# Patient Record
Sex: MALE | Race: BLACK OR AFRICAN AMERICAN | Employment: UNEMPLOYED | URBAN - METROPOLITAN AREA
[De-identification: names, ages, dates, MRNs, and addresses within clinical notes are randomized per-mention and may not be internally consistent; named-entity substitution may affect disease eponyms.]

---

## 2017-05-20 ENCOUNTER — ED HISTORICAL/CONVERTED ENCOUNTER (OUTPATIENT)
Dept: OTHER | Age: 72
End: 2017-05-20

## 2018-09-03 ENCOUNTER — ED HISTORICAL/CONVERTED ENCOUNTER (OUTPATIENT)
Dept: OTHER | Age: 73
End: 2018-09-03

## 2019-01-02 ENCOUNTER — ED HISTORICAL/CONVERTED ENCOUNTER (OUTPATIENT)
Dept: OTHER | Age: 74
End: 2019-01-02

## 2019-03-13 ENCOUNTER — ED HISTORICAL/CONVERTED ENCOUNTER (OUTPATIENT)
Dept: OTHER | Age: 74
End: 2019-03-13

## 2019-05-07 ENCOUNTER — ED HISTORICAL/CONVERTED ENCOUNTER (OUTPATIENT)
Dept: OTHER | Age: 74
End: 2019-05-07

## 2019-05-20 ENCOUNTER — ED HISTORICAL/CONVERTED ENCOUNTER (OUTPATIENT)
Dept: OTHER | Age: 74
End: 2019-05-20

## 2019-06-08 ENCOUNTER — ED HISTORICAL/CONVERTED ENCOUNTER (OUTPATIENT)
Dept: OTHER | Age: 74
End: 2019-06-08

## 2019-07-05 ENCOUNTER — ED HISTORICAL/CONVERTED ENCOUNTER (OUTPATIENT)
Dept: OTHER | Age: 74
End: 2019-07-05

## 2019-09-20 ENCOUNTER — ED HISTORICAL/CONVERTED ENCOUNTER (OUTPATIENT)
Dept: OTHER | Age: 74
End: 2019-09-20

## 2019-10-19 ENCOUNTER — ED HISTORICAL/CONVERTED ENCOUNTER (OUTPATIENT)
Dept: OTHER | Age: 74
End: 2019-10-19

## 2019-11-01 ENCOUNTER — ED HISTORICAL/CONVERTED ENCOUNTER (OUTPATIENT)
Dept: OTHER | Age: 74
End: 2019-11-01

## 2019-11-04 ENCOUNTER — ED HISTORICAL/CONVERTED ENCOUNTER (OUTPATIENT)
Dept: OTHER | Age: 74
End: 2019-11-04

## 2019-11-09 ENCOUNTER — ED HISTORICAL/CONVERTED ENCOUNTER (OUTPATIENT)
Dept: OTHER | Age: 74
End: 2019-11-09

## 2019-11-22 ENCOUNTER — ED HISTORICAL/CONVERTED ENCOUNTER (OUTPATIENT)
Dept: OTHER | Age: 74
End: 2019-11-22

## 2019-12-03 ENCOUNTER — ED HISTORICAL/CONVERTED ENCOUNTER (OUTPATIENT)
Dept: OTHER | Age: 74
End: 2019-12-03

## 2019-12-13 ENCOUNTER — ED HISTORICAL/CONVERTED ENCOUNTER (OUTPATIENT)
Dept: OTHER | Age: 74
End: 2019-12-13

## 2019-12-24 ENCOUNTER — ED HISTORICAL/CONVERTED ENCOUNTER (OUTPATIENT)
Dept: OTHER | Age: 74
End: 2019-12-24

## 2019-12-25 ENCOUNTER — ED HISTORICAL/CONVERTED ENCOUNTER (OUTPATIENT)
Dept: OTHER | Age: 74
End: 2019-12-25

## 2019-12-28 ENCOUNTER — ED HISTORICAL/CONVERTED ENCOUNTER (OUTPATIENT)
Dept: OTHER | Age: 74
End: 2019-12-28

## 2019-12-29 ENCOUNTER — ED HISTORICAL/CONVERTED ENCOUNTER (OUTPATIENT)
Dept: OTHER | Age: 74
End: 2019-12-29

## 2020-01-01 ENCOUNTER — ED HISTORICAL/CONVERTED ENCOUNTER (OUTPATIENT)
Dept: OTHER | Age: 75
End: 2020-01-01

## 2020-01-11 ENCOUNTER — ED HISTORICAL/CONVERTED ENCOUNTER (OUTPATIENT)
Dept: OTHER | Age: 75
End: 2020-01-11

## 2020-02-04 ENCOUNTER — ED HISTORICAL/CONVERTED ENCOUNTER (OUTPATIENT)
Dept: OTHER | Age: 75
End: 2020-02-04

## 2020-02-05 ENCOUNTER — ED HISTORICAL/CONVERTED ENCOUNTER (OUTPATIENT)
Dept: OTHER | Age: 75
End: 2020-02-05

## 2020-02-06 ENCOUNTER — ED HISTORICAL/CONVERTED ENCOUNTER (OUTPATIENT)
Dept: OTHER | Age: 75
End: 2020-02-06

## 2020-02-08 ENCOUNTER — ED HISTORICAL/CONVERTED ENCOUNTER (OUTPATIENT)
Dept: OTHER | Age: 75
End: 2020-02-08

## 2020-02-11 ENCOUNTER — ED HISTORICAL/CONVERTED ENCOUNTER (OUTPATIENT)
Dept: OTHER | Age: 75
End: 2020-02-11

## 2020-02-16 ENCOUNTER — ED HISTORICAL/CONVERTED ENCOUNTER (OUTPATIENT)
Dept: OTHER | Age: 75
End: 2020-02-16

## 2020-02-21 ENCOUNTER — ED HISTORICAL/CONVERTED ENCOUNTER (OUTPATIENT)
Dept: OTHER | Age: 75
End: 2020-02-21

## 2020-02-24 ENCOUNTER — ED HISTORICAL/CONVERTED ENCOUNTER (OUTPATIENT)
Dept: OTHER | Age: 75
End: 2020-02-24

## 2020-03-08 ENCOUNTER — ED HISTORICAL/CONVERTED ENCOUNTER (OUTPATIENT)
Dept: OTHER | Age: 75
End: 2020-03-08

## 2020-03-15 ENCOUNTER — ED HISTORICAL/CONVERTED ENCOUNTER (OUTPATIENT)
Dept: OTHER | Age: 75
End: 2020-03-15

## 2020-03-16 ENCOUNTER — ED HISTORICAL/CONVERTED ENCOUNTER (OUTPATIENT)
Dept: OTHER | Age: 75
End: 2020-03-16

## 2020-03-18 ENCOUNTER — ED HISTORICAL/CONVERTED ENCOUNTER (OUTPATIENT)
Dept: OTHER | Age: 75
End: 2020-03-18

## 2020-04-11 ENCOUNTER — ED HISTORICAL/CONVERTED ENCOUNTER (OUTPATIENT)
Dept: OTHER | Age: 75
End: 2020-04-11

## 2020-05-14 ENCOUNTER — ED HISTORICAL/CONVERTED ENCOUNTER (OUTPATIENT)
Dept: OTHER | Age: 75
End: 2020-05-14

## 2020-05-20 ENCOUNTER — ED HISTORICAL/CONVERTED ENCOUNTER (OUTPATIENT)
Dept: OTHER | Age: 75
End: 2020-05-20

## 2022-08-20 ENCOUNTER — HOSPITAL ENCOUNTER (EMERGENCY)
Dept: CT IMAGING | Age: 77
Discharge: HOME | End: 2022-08-20
Attending: NURSE PRACTITIONER
Payer: MEDICARE

## 2022-08-20 ENCOUNTER — HOSPITAL ENCOUNTER (EMERGENCY)
Age: 77
Discharge: HOME | End: 2022-08-20
Attending: STUDENT IN AN ORGANIZED HEALTH CARE EDUCATION/TRAINING PROGRAM
Payer: MEDICARE

## 2022-08-20 VITALS
OXYGEN SATURATION: 97 % | SYSTOLIC BLOOD PRESSURE: 148 MMHG | HEART RATE: 86 BPM | RESPIRATION RATE: 16 BRPM | DIASTOLIC BLOOD PRESSURE: 89 MMHG

## 2022-08-20 DIAGNOSIS — M54.50 ACUTE MIDLINE LOW BACK PAIN WITHOUT SCIATICA: ICD-10-CM
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
## 2022-08-20 PROCEDURE — 70450 CT HEAD/BRAIN W/O DYE: CPT

## 2022-08-20 PROCEDURE — 72131 CT LUMBAR SPINE W/O DYE: CPT

## 2022-08-20 PROCEDURE — 72125 CT NECK SPINE W/O DYE: CPT

## 2022-08-20 PROCEDURE — 99284 EMERGENCY DEPT VISIT MOD MDM: CPT

## 2022-08-20 RX ORDER — IBUPROFEN 600 MG/1
600 TABLET ORAL
Qty: 20 TABLET | Refills: 0 | Status: SHIPPED | OUTPATIENT
Start: 2022-08-20 | End: 2022-08-27

## 2022-08-20 RX ORDER — METHOCARBAMOL 500 MG/1
500 TABLET, FILM COATED ORAL
Qty: 21 TABLET | Refills: 0 | Status: SHIPPED | OUTPATIENT
Start: 2022-08-20 | End: 2022-08-27
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
## 2022-12-27 ENCOUNTER — HOSPITAL ENCOUNTER (EMERGENCY)
Age: 77
Discharge: HOME | End: 2022-12-27
Attending: EMERGENCY MEDICINE
Payer: MEDICARE

## 2022-12-27 ENCOUNTER — APPOINTMENT (OUTPATIENT)
Dept: GENERAL RADIOLOGY | Age: 77
End: 2022-12-27
Attending: EMERGENCY MEDICINE
Payer: MEDICARE

## 2022-12-27 VITALS
HEART RATE: 85 BPM | OXYGEN SATURATION: 96 % | BODY MASS INDEX: 32.19 KG/M2 | DIASTOLIC BLOOD PRESSURE: 85 MMHG | TEMPERATURE: 98.7 F | WEIGHT: 237.66 LBS | HEIGHT: 72 IN | RESPIRATION RATE: 18 BRPM | SYSTOLIC BLOOD PRESSURE: 142 MMHG

## 2022-12-27 DIAGNOSIS — U07.1 COVID-19: Primary | ICD-10-CM

## 2022-12-27 LAB
FLUAV RNA SPEC QL NAA+PROBE: NOT DETECTED
FLUBV RNA SPEC QL NAA+PROBE: NOT DETECTED
SARS-COV-2, COV2: DETECTED

## 2022-12-27 PROCEDURE — 87636 SARSCOV2 & INF A&B AMP PRB: CPT

## 2022-12-27 PROCEDURE — 74011250637 HC RX REV CODE- 250/637: Performed by: EMERGENCY MEDICINE

## 2022-12-27 PROCEDURE — 71045 X-RAY EXAM CHEST 1 VIEW: CPT

## 2022-12-27 PROCEDURE — 74011250636 HC RX REV CODE- 250/636: Performed by: EMERGENCY MEDICINE

## 2022-12-27 PROCEDURE — 99284 EMERGENCY DEPT VISIT MOD MDM: CPT

## 2022-12-27 PROCEDURE — 96372 THER/PROPH/DIAG INJ SC/IM: CPT

## 2022-12-27 RX ORDER — KETOROLAC TROMETHAMINE 30 MG/ML
30 INJECTION, SOLUTION INTRAMUSCULAR; INTRAVENOUS
Status: COMPLETED | OUTPATIENT
Start: 2022-12-27 | End: 2022-12-27

## 2022-12-27 RX ORDER — ACETAMINOPHEN 500 MG
1000 TABLET ORAL ONCE
Status: COMPLETED | OUTPATIENT
Start: 2022-12-27 | End: 2022-12-27

## 2022-12-27 RX ADMIN — ACETAMINOPHEN 1000 MG: 500 TABLET ORAL at 19:59

## 2022-12-27 RX ADMIN — KETOROLAC TROMETHAMINE 30 MG: 30 INJECTION, SOLUTION INTRAMUSCULAR; INTRAVENOUS at 19:59

## 2022-12-27 NOTE — ED TRIAGE NOTES
Patient presents to the ED with c/o generalized body aches and cough cold symptoms x3 days. Pt reports a cough and nasal congestions.

## 2022-12-28 NOTE — ED NOTES
Pt presents to ED reporting cold-like symptoms x 3 days and arthritic pain to back, right shoulder, and right knee. Pt denies known sick contacts but states he knows people around him have been sick. Pt endorsing productive cough with tan mucus, rhinorrhea, sore throat, and nasal congestion. Pt endorses shortness of breath but states this is normal for him. Pt appears confused during assessment but is able to answer questions appropriately. Pt asking for Tylenol for pain. Emergency Department Nursing Plan of Care       The Nursing Plan of Care is developed from the Nursing assessment and Emergency Department Attending provider initial evaluation. The plan of care may be reviewed in the ED Provider note.     The Plan of Care was developed with the following considerations:   Patient / Family readiness to learn indicated by:verbalized understanding  Persons(s) to be included in education: patient  Barriers to Learning/Limitations:No    Signed     Roger Zelaya RN    12/27/2022   7:47 PM

## 2022-12-28 NOTE — ED NOTES
Discharge instructions were given to the patient by Astrid Kidd RN. The patient left the Emergency Department ambulatory, alert and oriented and in no acute distress with no prescriptions. The patient was encouraged to call or return to the ED for worsening issues or problems and was encouraged to schedule a follow up appointment for continuing care. The patient verbalized understanding of discharge instructions and prescriptions, all questions were answered. The patient has no further concerns at this time.

## 2022-12-28 NOTE — ED PROVIDER NOTES
EMERGENCY DEPARTMENT HISTORY AND PHYSICAL EXAM      Date: 12/27/2022  Patient Name: Ehsan Gilliam  Patient Age and Sex: 68 y.o. male     History of Presenting Illness     Chief Complaint   Patient presents with    Generalized Body Aches    Cold Symptoms       History Provided By: Patient    HPI: Ehsan Gilliam is a 59-year-old history of hypertension, diabetes presenting with cough myalgias, subjective fever. He reports over the 4 to 5 days he has had diffuse myalgias and arthralgias involving his back his joints with associated headache, cough runny nose. Cough is productive for brown sputum, he has noted subjective fevers as well. No shortness of breath. No nausea vomiting or diarrhea. He did not receive his flu shot this year. He believes he is vaccinated against COVID but he is unsure. He denies tobacco abuse or any history of lung disease. There are no other complaints, changes, or physical findings at this time. PCP: None    No current facility-administered medications on file prior to encounter. No current outpatient medications on file prior to encounter. Past History     Past Medical History:  Hypertension, diabetes, recent homelessness    Past Surgical History:  No prior surgeries    Family History:  No pertinent family history    Social History:  Social History     Tobacco Use    Smoking status: Unknown   Substance Use Topics    Alcohol use: Not Currently    Drug use: Not Currently       Allergies:  No Known Allergies      Review of Systems   Review of Systems   Constitutional:  Positive for fever. Negative for chills. HENT:  Positive for congestion and rhinorrhea. Respiratory:  Positive for cough. Negative for shortness of breath. Cardiovascular:  Negative for chest pain. Gastrointestinal:  Negative for abdominal pain, diarrhea, nausea and vomiting. Genitourinary:  Negative for dysuria and frequency. Musculoskeletal:  Positive for arthralgias and myalgias.    Skin: Negative for rash. Neurological:  Positive for headaches. Negative for weakness and numbness. All other systems reviewed and are negative. Physical Exam   Physical Exam  Vitals and nursing note reviewed. Constitutional:       Appearance: Normal appearance. HENT:      Head: Normocephalic and atraumatic. Mouth/Throat:      Mouth: Mucous membranes are moist.   Eyes:      Conjunctiva/sclera: Conjunctivae normal.   Cardiovascular:      Rate and Rhythm: Normal rate and regular rhythm. Pulmonary:      Effort: Pulmonary effort is normal.      Breath sounds: Normal breath sounds. Abdominal:      General: Abdomen is flat. Musculoskeletal:         General: No deformity. Right lower leg: No edema. Left lower leg: No edema. Skin:     General: Skin is warm and dry. Neurological:      Mental Status: He is alert and oriented to person, place, and time. Mental status is at baseline. Psychiatric:         Behavior: Behavior normal.         Thought Content: Thought content normal.        Diagnostic Study Results     Labs -     Recent Results (from the past 12 hour(s))   COVID-19 WITH INFLUENZA A/B    Collection Time: 12/27/22  7:50 PM   Result Value Ref Range    SARS-CoV-2 by PCR Detected (A) NOTD      Influenza A by PCR Not detected      Influenza B by PCR Not detected         Radiologic Studies -   XR CHEST PORT   Final Result      No acute process on portable chest.           CT Results  (Last 48 hours)      None          CXR Results  (Last 48 hours)                 12/27/22 2021  XR CHEST PORT Final result    Impression:      No acute process on portable chest.           Narrative:  EXAM:  XR CHEST PORT       INDICATION: Productive cough with fever       COMPARISON: May 2017       TECHNIQUE: portable chest AP view       FINDINGS: The cardiac silhouette is within normal limits. The pulmonary   vasculature is within normal limits. The lungs and pleural spaces are clear.  The visualized bones and upper abdomen   are age-appropriate. Medical Decision Making   I am the first provider for this patient. I reviewed the vital signs, available nursing notes, past medical history, past surgical history, family history and social history. Vital Signs-Reviewed the patient's vital signs. Patient Vitals for the past 12 hrs:   Temp Pulse Resp BP SpO2   12/27/22 1855 98.7 °F (37.1 °C) 85 18 (!) 142/85 96 %       Records Reviewed: Nursing Notes and Old Medical Records    Provider Notes (Medical Decision Making):   Recently undomiciled male presenting with cough and cold symptoms    He is well-appearing well-perfused satting 96% on room air without abnormal breath sounds. However he is endorsing productive cough of brown sputum will obtain chest x-ray to evaluate for pneumonia. Diabetic, will defer steroids as he has clear breath sounds at this time. Will test for flu COVID, give shot of Toradol for his diffuse arthralgias. ED Course:   Initial assessment performed. The patients presenting problems have been discussed, and they are in agreement with the care plan formulated and outlined with them. I have encouraged them to ask questions as they arise throughout their visit. ED Course as of 12/27/22 2111 Tue Dec 27, 2022   2101 Chest x-ray is negative, COVID detected [WB]   2108 Patient states he takes multiple medications, though is unaware of their names. No meds in EMR. Unfortunately given multiple medication interaction we will not prescribe COVID antiviral given that I am unable to verify his medications [WB]      ED Course User Index  [WB] Leonel Gutierrez MD     Disposition:  Discharge Note:  The patient has been re-evaluated and is ready for discharge. Reviewed available results with patient. Counseled patient on diagnosis and care plan. Patient has expressed understanding, and all questions have been answered.  Patient agrees with plan and agrees to follow up as recommended, or to return to the ED if their symptoms worsen. Discharge instructions have been provided and explained to the patient, along with reasons to return to the ED. PLAN:  There are no discharge medications for this patient. 2.   Follow-up Information       Follow up With Specialties Details Why [address] [facility] EMERGENCY DEPT Emergency Medicine  As needed, If symptoms worsen [address]          3. Return to ED if worse     Diagnosis     Clinical Impression:   1. COVID-19        Attestations:    Nicolasa Baumann M.D. Please note that this dictation was completed with [employer], the computer voice recognition software. Quite often unanticipated grammatical, syntax, homophones, and other interpretive errors are inadvertently transcribed by the computer software. Please disregard these errors. Please excuse any errors that have escaped final proofreading. Thank you.

## 2023-01-22 ENCOUNTER — HOSPITAL ENCOUNTER (EMERGENCY)
Age: 78
Discharge: HOME | End: 2023-01-22
Attending: EMERGENCY MEDICINE
Payer: MEDICARE

## 2023-01-22 VITALS
BODY MASS INDEX: 32.23 KG/M2 | RESPIRATION RATE: 18 BRPM | TEMPERATURE: 96.8 F | DIASTOLIC BLOOD PRESSURE: 82 MMHG | WEIGHT: 237.66 LBS | SYSTOLIC BLOOD PRESSURE: 136 MMHG | OXYGEN SATURATION: 98 % | HEART RATE: 68 BPM

## 2023-01-22 DIAGNOSIS — G89.29 CHRONIC BILATERAL LOW BACK PAIN WITHOUT SCIATICA: ICD-10-CM

## 2023-01-22 DIAGNOSIS — M19.90 OSTEOARTHRITIS, UNSPECIFIED OSTEOARTHRITIS TYPE, UNSPECIFIED SITE: Primary | ICD-10-CM

## 2023-01-22 DIAGNOSIS — M54.50 CHRONIC BILATERAL LOW BACK PAIN WITHOUT SCIATICA: ICD-10-CM

## 2023-01-22 PROCEDURE — 99283 EMERGENCY DEPT VISIT LOW MDM: CPT

## 2023-01-22 PROCEDURE — 74011250637 HC RX REV CODE- 250/637: Performed by: EMERGENCY MEDICINE

## 2023-01-22 RX ORDER — IBUPROFEN 600 MG/1
600 TABLET ORAL
Qty: 50 TABLET | Refills: 0 | Status: SHIPPED | OUTPATIENT
Start: 2023-01-22

## 2023-01-22 RX ORDER — ACETAMINOPHEN 325 MG/1
650 TABLET ORAL
Qty: 50 TABLET | Refills: 0 | Status: SHIPPED | OUTPATIENT
Start: 2023-01-22

## 2023-01-22 RX ORDER — ACETAMINOPHEN 325 MG/1
650 TABLET ORAL
Qty: 20 TABLET | Refills: 0 | Status: SHIPPED | OUTPATIENT
Start: 2023-01-22 | End: 2023-01-22 | Stop reason: SDUPTHER

## 2023-01-22 RX ORDER — IBUPROFEN 600 MG/1
600 TABLET ORAL
Qty: 20 TABLET | Refills: 0 | Status: SHIPPED | OUTPATIENT
Start: 2023-01-22 | End: 2023-01-22 | Stop reason: SDUPTHER

## 2023-01-22 RX ORDER — IBUPROFEN 400 MG/1
800 TABLET ORAL ONCE
Status: COMPLETED | OUTPATIENT
Start: 2023-01-22 | End: 2023-01-22

## 2023-01-22 RX ORDER — ACETAMINOPHEN 500 MG
1000 TABLET ORAL ONCE
Status: COMPLETED | OUTPATIENT
Start: 2023-01-22 | End: 2023-01-22

## 2023-01-22 RX ADMIN — IBUPROFEN 800 MG: 400 TABLET, FILM COATED ORAL at 14:19

## 2023-01-22 RX ADMIN — ACETAMINOPHEN 1000 MG: 500 TABLET ORAL at 14:19

## 2023-01-22 NOTE — ED NOTES
Emergency Department Nursing Plan of Care       The Nursing Plan of Care is developed from the Nursing assessment and Emergency Department Attending provider initial evaluation. The plan of care may be reviewed in the ED Provider note.     The Plan of Care was developed with the following considerations:   Patient / Family readiness to learn indicated by:verbalized understanding  Persons(s) to be included in education: patient  Barriers to Learning/Limitations:No    [id number] [facility] MOHSEN Serra    1/22/2023   2:31 PM

## 2023-01-22 NOTE — ED PROVIDER NOTES
[facility] EMERGENCY DEPT  EMERGENCY DEPARTMENT ENCOUNTER       Pt Name: Jennifer Waddell  MRN: [id number]  [place] 1945  Date of evaluation: 1/22/2023  Provider: Verónica Gonzalez MD   PCP: None  Note Started: 2:03 PM 1/22/23     CHIEF COMPLAINT       Chief Complaint   Patient presents with    Back Pain        HISTORY OF PRESENT ILLNESS: 1 or more elements      History From: Patient, History limited by: None     Jennifer Waddell is a 68 y.o. male who presents with chronic aches and pains. He reports pains to his low back, left elbow and right knee for months to years. Reports some worsening of his chronic pain as temperatures have dropped. He is currently homeless and sleeping outside, attributes his pain mostly to the cold. No fevers. No chills, denies any chronic medical issues. Nursing Notes were all reviewed and agreed with or any disagreements were addressed in the HPI. REVIEW OF SYSTEMS        Positives and Pertinent negatives as per HPI. PAST HISTORY     Past Medical History:  No past medical history on file. Past Surgical History:  No past surgical history on file. Family History:  No family history on file. Social History:  Social History     Tobacco Use    Smoking status: Unknown   Substance Use Topics    Alcohol use: Not Currently    Drug use: Not Currently       Allergies:  No Known Allergies    CURRENT MEDICATIONS      Current Discharge Medication List          SCREENINGS               No data recorded         PHYSICAL EXAM      ED Triage Vitals [01/22/23 1336]   ED Encounter Vitals Group      /82      Pulse (Heart Rate) 68      Resp Rate 18      Temp 96.8 °F (36 °C)      Temp src       O2 Sat (%) 98 %      Weight 237 lb 10.5 oz      Height         Physical Exam  Vitals and nursing note reviewed. Constitutional:       General: He is not in acute distress. Appearance: He is obese. He is not ill-appearing or toxic-appearing.       Comments: Unkempt, malodorous   HENT:      Head: Normocephalic and atraumatic. Pulmonary:      Effort: Pulmonary effort is normal.      Breath sounds: Normal breath sounds. Musculoskeletal:      Right lower leg: No edema. Left lower leg: No edema. Comments: No midline CT or L-spine tenderness to palpation. There is no bony tenderness palpation of left elbow, no pain with range of motion of left elbow. Right knee without any bony tenderness to palpation, mild pain with passive range of motion, no palpable effusion, no warmth. Skin:     General: Skin is warm and dry. DIAGNOSTIC RESULTS   LABS:     No results found for this or any previous visit (from the past 12 hour(s)). EKG: If performed, independent interpretation documented below in the MDM section     RADIOLOGY:  Non-plain film images such as CT, Ultrasound and MRI are read by the radiologist. Plain radiographic images are visualized and preliminarily interpreted by the ED Provider with the findings documented in the MDM section. Interpretation per the Radiologist below, if available at the time of this note:     No results found. PROCEDURES   Unless otherwise noted below, none  Procedures     CRITICAL CARE TIME       EMERGENCY DEPARTMENT COURSE and DIFFERENTIAL DIAGNOSIS/MDM   Vitals:    Vitals:    01/22/23 1336   BP: 136/82   Pulse: 68   Resp: 18   Temp: 96.8 °F (36 °C)   SpO2: 98%   Weight: 107.8 kg (237 lb 10.5 oz)        Patient was given the following medications:  Medications   acetaminophen (TYLENOL) tablet 1,000 mg (has no administration in time range)   ibuprofen (MOTRIN) tablet 800 mg (has no administration in time range)       Medical Decision Making  DDx acute on chronic osteoarthritis, will review past records to evaluate for prior imaging of spine or joints.   Consider other etiology of arthritis however in absence of trauma, no fever or systemic symptoms, no warmth or palpable effusion, I doubt acute infection including SEA or septic arthritis    He is otherwise well-appearing, nonfocal neurologic exam of the lower extremities. We will treat presumptive acute on chronic osteoarthritis, offer list of shelters as he is requesting help with housing. Given something to eat and discharge him. Consider imaging however given absent trauma, no bony tenderness, I do not believe there is benefit to osseous imaging of joints or spine at this time. Risk  OTC drugs. Prescription drug management. Diagnosis or treatment significantly limited by social determinants of health. ED Course as of 01/22/23 1415   Sun Jan 22, 2023   9386 Chart review patient has been seen at outside ED for formerly [facility] August of last year, discharged with Robaxin and Motrin, CT head spine negative for acute injury at the time. Imaging report reviewed by myself shows severe foraminal stenosis, mild central canal stenosis [WB]      ED Course User Index  [WB] Elizabeth Khan MD         FINAL IMPRESSION     1. Osteoarthritis, unspecified osteoarthritis type, unspecified site    2. Chronic bilateral low back pain without sciatica          DISPOSITION/PLAN   Gabriel Nichols's  results have been reviewed with him. He has been counseled regarding his diagnosis, treatment, and plan. He verbally conveys understanding and agreement of the signs, symptoms, diagnosis, treatment and prognosis and additionally agrees to follow up as discussed. He also agrees with the care-plan and conveys that all of his questions have been answered. I have also provided discharge instructions for him that include: educational information regarding their diagnosis and treatment, and list of reasons why they would want to return to the ED prior to their follow-up appointment, should his condition change. CLINICAL IMPRESSION    Discharge Note: The patient is stable for discharge home. The signs, symptoms, diagnosis, and discharge instructions have been discussed, understanding conveyed, and agreed upon.  The patient is to follow up as recommended or return to ER should their symptoms worsen. PATIENT REFERRED TO:  Follow-up Information       Follow up With Specialties Details Why [address] [facility] EMERGENCY DEPT Emergency Medicine  As needed, If symptoms worsen [address] [facility]              DISCHARGE MEDICATIONS:  Current Discharge Medication List        START taking these medications    Details   acetaminophen (TYLENOL) 325 mg tablet Take 2 Tablets by mouth every four (4) hours as needed for Pain. Qty: 50 Tablet, Refills: 0  Start date: 1/22/2023      ibuprofen (MOTRIN) 600 mg tablet Take 1 Tablet by mouth every six (6) hours as needed for Pain. Qty: 50 Tablet, Refills: 0  Start date: 1/22/2023                 DISCONTINUED MEDICATIONS:  Current Discharge Medication List            I am the Primary Clinician of Record. Wild Fernando MD (electronically signed)    (Please note that parts of this dictation were completed with voice recognition software. Quite often unanticipated grammatical, syntax, homophones, and other interpretive errors are inadvertently transcribed by the computer software. Please disregards these errors.  Please excuse any errors that have escaped final proofreading.)

## 2023-01-22 NOTE — ED TRIAGE NOTES
Patient comes to the ED via EMS. Patient c/o back pain but will not elaborate. Said the information is \"in his record\". Will not answer questions at times.   Patient speaking of his living situation and asking for a

## 2023-01-22 NOTE — DISCHARGE INSTRUCTIONS
I recommend picking up the medications I did provide at our pharmacy and taking them every 6 hours as needed for ongoing aches and pains. If you develop fever, swelling or skin changes over the joints affected please return to emergency department for further evaluation. Please seek shelter one of the provided locations, you may return during weekday work hours to speak to a .

## 2024-01-14 ENCOUNTER — HOSPITAL ENCOUNTER (EMERGENCY)
Facility: HOSPITAL | Age: 79
Discharge: HOME | End: 2024-01-14
Attending: STUDENT IN AN ORGANIZED HEALTH CARE EDUCATION/TRAINING PROGRAM
Payer: MEDICARE

## 2024-01-14 VITALS
WEIGHT: 230 LBS | DIASTOLIC BLOOD PRESSURE: 78 MMHG | OXYGEN SATURATION: 99 % | RESPIRATION RATE: 18 BRPM | BODY MASS INDEX: 29.52 KG/M2 | HEIGHT: 74 IN | HEART RATE: 64 BPM | SYSTOLIC BLOOD PRESSURE: 132 MMHG | TEMPERATURE: 98.2 F

## 2024-01-14 DIAGNOSIS — M25.561 CHRONIC PAIN OF RIGHT KNEE: ICD-10-CM

## 2024-01-14 DIAGNOSIS — J06.9 VIRAL URI WITH COUGH: Primary | ICD-10-CM

## 2024-01-14 DIAGNOSIS — G89.29 CHRONIC PAIN OF RIGHT KNEE: ICD-10-CM

## 2024-01-14 PROCEDURE — 96372 THER/PROPH/DIAG INJ SC/IM: CPT

## 2024-01-14 PROCEDURE — 6360000002 HC RX W HCPCS: Performed by: STUDENT IN AN ORGANIZED HEALTH CARE EDUCATION/TRAINING PROGRAM

## 2024-01-14 PROCEDURE — 99284 EMERGENCY DEPT VISIT MOD MDM: CPT

## 2024-01-14 RX ORDER — DEXTROMETHORPHAN HBR. AND GUAIFENESIN 10; 100 MG/5ML; MG/5ML
10 SOLUTION ORAL
Status: DISCONTINUED | OUTPATIENT
Start: 2024-01-14 | End: 2024-01-14

## 2024-01-14 RX ORDER — ACETAMINOPHEN 325 MG/1
650 TABLET ORAL EVERY 6 HOURS PRN
Qty: 120 TABLET | Refills: 3 | Status: SHIPPED | OUTPATIENT
Start: 2024-01-14

## 2024-01-14 RX ORDER — DEXTROMETHORPHAN HBR. AND GUAIFENESIN 10; 100 MG/5ML; MG/5ML
10 SOLUTION ORAL EVERY 4 HOURS PRN
Status: DISCONTINUED | OUTPATIENT
Start: 2024-01-14 | End: 2024-01-14

## 2024-01-14 RX ORDER — BENZONATATE 100 MG/1
100 CAPSULE ORAL 2 TIMES DAILY PRN
Qty: 20 CAPSULE | Refills: 0 | Status: SHIPPED | OUTPATIENT
Start: 2024-01-14 | End: 2024-01-21

## 2024-01-14 RX ORDER — KETOROLAC TROMETHAMINE 15 MG/ML
15 INJECTION, SOLUTION INTRAMUSCULAR; INTRAVENOUS
Status: COMPLETED | OUTPATIENT
Start: 2024-01-14 | End: 2024-01-14

## 2024-01-14 RX ORDER — GUAIFENESIN/DEXTROMETHORPHAN 100-10MG/5
10 SYRUP ORAL ONCE
Status: DISCONTINUED | OUTPATIENT
Start: 2024-01-14 | End: 2024-01-14 | Stop reason: HOSPADM

## 2024-01-14 RX ADMIN — KETOROLAC TROMETHAMINE 15 MG: 15 INJECTION, SOLUTION INTRAMUSCULAR; INTRAVENOUS at 04:44

## 2024-01-14 ASSESSMENT — LIFESTYLE VARIABLES
HOW MANY STANDARD DRINKS CONTAINING ALCOHOL DO YOU HAVE ON A TYPICAL DAY: PATIENT DOES NOT DRINK
HOW OFTEN DO YOU HAVE A DRINK CONTAINING ALCOHOL: NEVER

## 2024-01-14 NOTE — ED NOTES
Transportation has been arranged for this patient through insurance [phone].  Trip number [id number] due to be picked up around 8:40am( they may arrive 15min before or after the time provided).

## 2024-01-14 NOTE — ED NOTES
Multiple attempts made to pharmacy r/t ordered cough medicine without resolution, pt notified that is what we are waiting on for dispo
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
## 2024-01-14 NOTE — ED PROVIDER NOTES
Pain  What changed: You were already taking a medication with the same name, and this prescription was added. Make sure you understand how and when to take each.           * This list has 2 medication(s) that are the same as other medications prescribed for you. Read the directions carefully, and ask your doctor or other care provider to review them with you.                ASK your doctor about these medications      ibuprofen 600 MG tablet  Commonly known as: ADVIL;MOTRIN               Where to Get Your Medications        These medications were sent to [facility] [address] [place] [address] - P [phone] - F [phone]  [address] [place] [address]      Phone: [phone]   acetaminophen 325 MG tablet  benzonatate 100 MG capsule           DISCONTINUED MEDICATIONS:  Current Discharge Medication List          I am the Primary Clinician of Record.   Felice Romero MD (electronically signed)    (Please note that parts of this dictation were completed with voice recognition software. Quite often unanticipated grammatical, syntax, homophones, and other interpretive errors are inadvertently transcribed by the computer software. Please disregards these errors. Please excuse any errors that have escaped final proofreading.)       Felice Romero MD  01/14/24 0832

## 2024-01-31 ENCOUNTER — APPOINTMENT (OUTPATIENT)
Facility: HOSPITAL | Age: 79
End: 2024-01-31
Payer: MEDICARE

## 2024-01-31 ENCOUNTER — HOSPITAL ENCOUNTER (EMERGENCY)
Facility: HOSPITAL | Age: 79
Discharge: HOME | End: 2024-01-31
Attending: EMERGENCY MEDICINE
Payer: MEDICARE

## 2024-01-31 ENCOUNTER — HOSPITAL ENCOUNTER (EMERGENCY)
Facility: HOSPITAL | Age: 79
Discharge: HOME | End: 2024-02-01
Attending: STUDENT IN AN ORGANIZED HEALTH CARE EDUCATION/TRAINING PROGRAM
Payer: MEDICARE

## 2024-01-31 VITALS
HEART RATE: 67 BPM | WEIGHT: 245 LBS | HEIGHT: 74 IN | SYSTOLIC BLOOD PRESSURE: 124 MMHG | DIASTOLIC BLOOD PRESSURE: 84 MMHG | BODY MASS INDEX: 31.44 KG/M2 | TEMPERATURE: 98.9 F | RESPIRATION RATE: 16 BRPM | OXYGEN SATURATION: 98 %

## 2024-01-31 DIAGNOSIS — Z59.00 HOMELESS: Primary | ICD-10-CM

## 2024-01-31 DIAGNOSIS — M25.562 CHRONIC PAIN OF BOTH KNEES: Primary | ICD-10-CM

## 2024-01-31 DIAGNOSIS — M25.561 CHRONIC PAIN OF BOTH KNEES: Primary | ICD-10-CM

## 2024-01-31 DIAGNOSIS — Z59.41 FOOD INSECURITY: ICD-10-CM

## 2024-01-31 DIAGNOSIS — G89.29 CHRONIC PAIN OF BOTH KNEES: Primary | ICD-10-CM

## 2024-01-31 DIAGNOSIS — M06.9 RHEUMATOID ARTHRITIS INVOLVING BOTH HANDS, UNSPECIFIED WHETHER RHEUMATOID FACTOR PRESENT (HCC): ICD-10-CM

## 2024-01-31 PROCEDURE — 99283 EMERGENCY DEPT VISIT LOW MDM: CPT

## 2024-01-31 PROCEDURE — 6370000000 HC RX 637 (ALT 250 FOR IP): Performed by: EMERGENCY MEDICINE

## 2024-01-31 PROCEDURE — 6370000000 HC RX 637 (ALT 250 FOR IP): Performed by: STUDENT IN AN ORGANIZED HEALTH CARE EDUCATION/TRAINING PROGRAM

## 2024-01-31 PROCEDURE — 71045 X-RAY EXAM CHEST 1 VIEW: CPT

## 2024-01-31 PROCEDURE — 93005 ELECTROCARDIOGRAM TRACING: CPT | Performed by: STUDENT IN AN ORGANIZED HEALTH CARE EDUCATION/TRAINING PROGRAM

## 2024-01-31 RX ORDER — HYDROCODONE BITARTRATE AND ACETAMINOPHEN 5; 325 MG/1; MG/1
1 TABLET ORAL
Status: COMPLETED | OUTPATIENT
Start: 2024-01-31 | End: 2024-01-31

## 2024-01-31 RX ORDER — IBUPROFEN 600 MG/1
600 TABLET ORAL
Status: COMPLETED | OUTPATIENT
Start: 2024-01-31 | End: 2024-01-31

## 2024-01-31 RX ADMIN — IBUPROFEN 600 MG: 600 TABLET, FILM COATED ORAL at 11:57

## 2024-01-31 RX ADMIN — HYDROCODONE BITARTRATE AND ACETAMINOPHEN 1 TABLET: 5; 325 TABLET ORAL at 23:48

## 2024-01-31 RX ADMIN — IBUPROFEN 600 MG: 600 TABLET, FILM COATED ORAL at 23:48

## 2024-01-31 SDOH — ECONOMIC STABILITY - HOUSING INSECURITY: HOMELESSNESS UNSPECIFIED: Z59.00

## 2024-01-31 SDOH — ECONOMIC STABILITY - FOOD INSECURITY: FOOD INSECURITY: Z59.41

## 2024-01-31 ASSESSMENT — LIFESTYLE VARIABLES
HOW OFTEN DO YOU HAVE A DRINK CONTAINING ALCOHOL: NEVER
HOW MANY STANDARD DRINKS CONTAINING ALCOHOL DO YOU HAVE ON A TYPICAL DAY: PATIENT DOES NOT DRINK
HOW MANY STANDARD DRINKS CONTAINING ALCOHOL DO YOU HAVE ON A TYPICAL DAY: 1 OR 2
HOW OFTEN DO YOU HAVE A DRINK CONTAINING ALCOHOL: MONTHLY OR LESS

## 2024-01-31 ASSESSMENT — PAIN - FUNCTIONAL ASSESSMENT
PAIN_FUNCTIONAL_ASSESSMENT: 0-10
PAIN_FUNCTIONAL_ASSESSMENT: 0-10

## 2024-01-31 ASSESSMENT — PAIN SCALES - GENERAL: PAINLEVEL_OUTOF10: 10

## 2024-01-31 NOTE — ED PROVIDER NOTES
[facility] EMERGENCY DEPT  EMERGENCY DEPARTMENT HISTORY AND PHYSICAL EXAM      Date: 1/31/2024  Patient Name: Kieran Braun  MRN: [id number]  Birthdate 1945  Date of evaluation: 1/31/2024  Provider: Esequiel Sarkar MD   Note Started: 9:58 AM EST 1/31/24    HISTORY OF PRESENT ILLNESS     Chief Complaint   Patient presents with    Knee Pain       History Provided By: Patient    HPI: Kieran Braun is a 78 y.o. male presents to the emergency room with complaint of bilateral knee pain.  Patient reports history of arthritis.  Patient denies fevers or chills, denies numbness or tingling.    PAST MEDICAL HISTORY   Past Medical History:  Past Medical History:   Diagnosis Date    Arthritis        Past Surgical History:  History reviewed. No pertinent surgical history.    Family History:  History reviewed. No pertinent family history.    Social History:  Social History     Tobacco Use    Smoking status: Some Days     Types: Cigarettes   Substance Use Topics    Alcohol use: Yes     Comment: occasionally    Drug use: Never       Allergies:  No Known Allergies    PCP: No primary care provider on file.    Current Meds:   Current Facility-Administered Medications   Medication Dose Route Frequency Provider Last Rate Last Admin    ibuprofen (ADVIL;MOTRIN) tablet 600 mg  600 mg Oral NOW Esequiel Sarkar MD         Current Outpatient Medications   Medication Sig Dispense Refill    acetaminophen (AMINOFEN) 325 MG tablet Take 2 tablets by mouth every 6 hours as needed for Pain 120 tablet 3    acetaminophen (TYLENOL) 325 MG tablet Take 2 tablets by mouth every 4 hours as needed      ibuprofen (ADVIL;MOTRIN) 600 MG tablet Take 1 tablet by mouth every 6 hours as needed         Social Determinants of Health:   Social Determinants of Health     Tobacco Use: High Risk (1/31/2024)    Patient History     Smoking Tobacco Use: Some Days     Smokeless Tobacco Use: Unknown     Passive Exposure: Not on file   Alcohol Use: Not At Risk

## 2024-01-31 NOTE — ED TRIAGE NOTES
Per EMS, pt walked to [facility], called EMS for arthritis flair up in bilateral knees. Pt also reports being homeless and needing help. Pt was ambulatory from stretcher to stretcher upon arrival. AxO4.

## 2024-01-31 NOTE — ED NOTES
Provider at bedside for dispo and follow up, all treatments completed as ordered. Discharge plan reviewed and paperwork signed, teaching completed regarding treatment received, medications and follow up care demonstrated and read back. pt wheeled out of the ER with no signs of acute distress.

## 2024-02-01 VITALS
HEIGHT: 74 IN | HEART RATE: 72 BPM | OXYGEN SATURATION: 99 % | BODY MASS INDEX: 31.44 KG/M2 | RESPIRATION RATE: 16 BRPM | SYSTOLIC BLOOD PRESSURE: 129 MMHG | DIASTOLIC BLOOD PRESSURE: 73 MMHG | WEIGHT: 245 LBS | TEMPERATURE: 98.2 F

## 2024-02-01 LAB
EKG ATRIAL RATE: 71 BPM
EKG DIAGNOSIS: NORMAL
EKG P AXIS: 42 DEGREES
EKG P-R INTERVAL: 144 MS
EKG Q-T INTERVAL: 416 MS
EKG QRS DURATION: 76 MS
EKG QTC CALCULATION (BAZETT): 452 MS
EKG R AXIS: -14 DEGREES
EKG T AXIS: 21 DEGREES
EKG VENTRICULAR RATE: 71 BPM

## 2024-02-01 PROCEDURE — 6370000000 HC RX 637 (ALT 250 FOR IP): Performed by: STUDENT IN AN ORGANIZED HEALTH CARE EDUCATION/TRAINING PROGRAM

## 2024-02-01 RX ORDER — AZITHROMYCIN 250 MG/1
250 TABLET, FILM COATED ORAL DAILY
Qty: 5 TABLET | Refills: 0 | Status: SHIPPED | OUTPATIENT
Start: 2024-02-01 | End: 2024-02-06

## 2024-02-01 RX ORDER — AZITHROMYCIN 500 MG/1
500 TABLET, FILM COATED ORAL
Status: COMPLETED | OUTPATIENT
Start: 2024-02-01 | End: 2024-02-01

## 2024-02-01 RX ADMIN — AZITHROMYCIN DIHYDRATE 500 MG: 500 TABLET ORAL at 01:33

## 2024-02-01 NOTE — DISCHARGE INSTRUCTIONS
Thank you!    Thank you for allowing me to care for you in the emergency department.  I sincerely hope that you are satisfied with your visit today.  It is my goal to provide you with excellent care.    Below you will find a list of your labs and imaging from your visit today if applicable. Should you have any questions regarding these results please do not hesitate to call the emergency department. Please review [employer] for a more detailed result list since the below list may not be comprehensive. Instructions on how to sign up to [employer] should be provided in this packet.    Labs -  No results found for this or any previous visit (from the past 12 hour(s)).    Radiologic Studies -   XR CHEST PORTABLE   Final Result      Mild pulmonary edema pattern.                If you feel that you have not received excellent quality care or timely care, please ask to speak to the nurse manager. Please choose us in the future for your continued health care needs.   ------------------------------------------------------------------------------------------------------------  The exam and treatment you received in the Emergency Department were for an urgent problem and are not intended as complete care. It is important that you follow-up with a doctor, nurse practitioner, or physician assistant to:  (1) confirm your diagnosis,  (2) re-evaluation of changes in your illness and treatment, and  (3) for ongoing care.  If your symptoms become worse or you do not improve as expected and you are unable to reach your usual health care provider, you should return to the Emergency Department. We are available 24 hours a day.     Please take your discharge instructions with you when you go to your follow-up appointment.     If a prescription has been provided, please have it filled as soon as possible to prevent a delay in treatment. Read the entire medication instruction sheet provided to you by the pharmacy. If you have any questions or

## 2024-02-01 NOTE — ED PROVIDER NOTES
[facility]  EMERGENCY DEPARTMENT ENCOUNTER NOTE    Date: 1/31/2024  Patient Name: Kieran Braun    History of Presenting Illness     Chief Complaint   Patient presents with    Shortness of Breath       History obtained from: Patient    HPI: Kieran Braun, 78 y.o. male with past medical history as listed and reviewed below presents for homelessness.    In triage, the patient indicated that he had chest pain and shortness of breath which he later said he does not have and that the main reason he came in is because it is cold outside in the cold is exacerbating his rheumatoid arthritis pain.  He reports that when he was referring to chest pain or shortness of breath, he reports that he has been having an intermittent cough over the past few days and when he coughs, he feels like it is anterior chest hurts.  Currently not present. No fevers or chills. The patient also has not eaten since 10 AM when he last was in the emergency department. Besides being un-housed and asking for shelter and resources, the patient has no acute complaints.     Patient denies any acute cardiovascular symptoms including any palpitations, lightheadedness, dizziness, syncope, orthopnea, dyspnea on exertion, or lower extremity swelling. Patient denies any stroke-like symptoms including diplopia, dysarthria, facial droop, seizures, vertigo, or weakness or numbness in the upper or lower extremities. Patient denies any abdominal pain, nausea or vomiting. No urinary symptoms. No fevers, chills, cough, or sputum production. No trauma. Patient is able to tolerate PO intake. Patient denies alcohol or drug use. Patient denies any suicidal or homicidal ideations. Patient reports being completely asymptomatic and doesn't have any other complaints currently besides hand pain from RA. The patient is at baseline health and comfort.    Medical History   I reviewed the medical, surgical, family, and social history, as

## 2024-05-05 ENCOUNTER — HOSPITAL ENCOUNTER (EMERGENCY)
Facility: HOSPITAL | Age: 79
Discharge: HOME | End: 2024-05-06
Attending: STUDENT IN AN ORGANIZED HEALTH CARE EDUCATION/TRAINING PROGRAM
Payer: MEDICARE

## 2024-05-05 DIAGNOSIS — Z59.00 HOMELESSNESS: Primary | ICD-10-CM

## 2024-05-05 PROCEDURE — 99284 EMERGENCY DEPT VISIT MOD MDM: CPT

## 2024-05-05 SDOH — ECONOMIC STABILITY - HOUSING INSECURITY: HOMELESSNESS UNSPECIFIED: Z59.00

## 2024-05-05 ASSESSMENT — PAIN - FUNCTIONAL ASSESSMENT: PAIN_FUNCTIONAL_ASSESSMENT: NONE - DENIES PAIN

## 2024-05-06 VITALS
SYSTOLIC BLOOD PRESSURE: 122 MMHG | OXYGEN SATURATION: 99 % | HEART RATE: 66 BPM | DIASTOLIC BLOOD PRESSURE: 74 MMHG | WEIGHT: 245 LBS | RESPIRATION RATE: 18 BRPM | BODY MASS INDEX: 31.44 KG/M2 | HEIGHT: 74 IN | TEMPERATURE: 98.7 F

## 2024-05-06 NOTE — ED TRIAGE NOTES
Pt BIB EMS [address] side [address] he was walking down the road.  Pt reports bilateral knee pain x2 months, pt homeless. NAD, non labored breathing.

## 2024-05-06 NOTE — ED PROVIDER NOTES
[facility] EMERGENCY DEPT  EMERGENCY DEPARTMENT HISTORY AND PHYSICAL EXAM      Date: 5/5/2024  Patient Name: Kieran Braun  MRN: [id number]  Birthdate 1945  Date of evaluation: 5/5/2024  Provider: Paris Mercer MD   Note Started: 10:07 PM EDT 5/5/24    HISTORY OF PRESENT ILLNESS     Chief Complaint   Patient presents with    Knee Pain       History Provided By: Patient    HPI: Kieran Braun is a 78 y.o. male with no chronic past medical history of note comes to the ED by EMS with chief complaint of knee pain.  Patient is homeless and was seen in room 26.  Upon questioning the patient about his symptoms he started expressing chronic symptoms including shortness of breath and knee pain has been going on for a long time.  He denies any acute worsening of those symptoms.  It takes a lot of prompting to encourage patient to engage in conversation.  However, he denies any concerns today.    PAST MEDICAL HISTORY   Past Medical History:  Past Medical History:   Diagnosis Date    Arthritis        Past Surgical History:  History reviewed. No pertinent surgical history.    Family History:  History reviewed. No pertinent family history.    Social History:  Social History     Tobacco Use    Smoking status: Some Days     Types: Cigarettes   Substance Use Topics    Alcohol use: Yes     Comment: occasionally    Drug use: Never       Allergies:  No Known Allergies    PCP: No primary care provider on file.    Current Meds:   No current facility-administered medications for this encounter.     Current Outpatient Medications   Medication Sig Dispense Refill    acetaminophen (AMINOFEN) 325 MG tablet Take 2 tablets by mouth every 6 hours as needed for Pain 120 tablet 3    acetaminophen (TYLENOL) 325 MG tablet Take 2 tablets by mouth every 4 hours as needed      ibuprofen (ADVIL;MOTRIN) 600 MG tablet Take 1 tablet by mouth every 6 hours as needed         Social Determinants of Health:   Social Determinants of Health     Tobacco Use:  AM Differential and Considerations:     Records Reviewed (source and summary of external notes): Prior medical records and Nursing notes.    Vitals:    Vitals:    05/05/24 2156 05/06/24 0030 05/06/24 0200   BP: 121/72 119/85 122/74   Pulse: 66     Resp: 18     Temp: 98.7 °F (37.1 °C)     TempSrc: Oral     SpO2: 97%  99%   Weight: 111.1 kg (245 lb)     Height: 1.88 m (6' 2\")       Patient is a 78-year-old male with past medical history of homelessness comes to the ED for evaluation of pain in his knees.  Upon me evaluate patient she started saying that he has shortness of breath.  However, on further questioning and encouraging the patient to provide answers, he denies any acute symptoms.  His examination revealed any signs of shortness of breath.  His lung examination is unremarkable.  Denies any exertional symptoms.  He otherwise does not have any chest pain or abdominal pain.  Patient reported today that she has knee pain.  However, patient with this is chronic and has been going on for quite some time.  Examination revealed any signs of tenderness or signs of trauma or injury.  Patient was given resources.  Otherwise, on his assessment today there is no concerning features for an acute illness.      ED IMPRESSION     1. Homelessness          DISPOSITION/PLAN   DISPOSITION Decision To Discharge 05/05/2024 10:07:25 PM    Discharge Note: The patient is stable for discharge home. The signs, symptoms, diagnosis, and discharge instructions have been discussed, understanding conveyed, and agreed upon. The patient is to follow up as recommended or return to ER should their symptoms worsen.      PATIENT REFERRED TO:  [facility] EMERGENCY DEPT  [address] [place], [place] [address] [phone]  Go to   As needed, If symptoms worsen    PCP    Schedule an appointment as soon as possible for a visit in 3 days  For reevaluation, For follow-up        DISCHARGE MEDICATIONS:     Medication List        ASK your doctor about

## 2024-08-08 ENCOUNTER — HOSPITAL ENCOUNTER (EMERGENCY)
Facility: HOSPITAL | Age: 79
Discharge: HOME | End: 2024-08-08
Attending: EMERGENCY MEDICINE
Payer: MEDICARE

## 2024-08-08 ENCOUNTER — APPOINTMENT (OUTPATIENT)
Facility: HOSPITAL | Age: 79
End: 2024-08-08
Payer: MEDICARE

## 2024-08-08 VITALS
RESPIRATION RATE: 11 BRPM | SYSTOLIC BLOOD PRESSURE: 130 MMHG | HEIGHT: 74 IN | TEMPERATURE: 97.8 F | HEART RATE: 60 BPM | DIASTOLIC BLOOD PRESSURE: 75 MMHG | BODY MASS INDEX: 28.23 KG/M2 | WEIGHT: 220 LBS | OXYGEN SATURATION: 99 %

## 2024-08-08 DIAGNOSIS — J01.10 ACUTE NON-RECURRENT FRONTAL SINUSITIS: ICD-10-CM

## 2024-08-08 DIAGNOSIS — B30.9 ACUTE VIRAL CONJUNCTIVITIS OF BOTH EYES: Primary | ICD-10-CM

## 2024-08-08 LAB
ALBUMIN SERPL-MCNC: 3.3 G/DL (ref 3.5–5)
ALBUMIN/GLOB SERPL: 0.8 (ref 1.1–2.2)
ALP SERPL-CCNC: 95 U/L (ref 45–117)
ALT SERPL-CCNC: 21 U/L (ref 12–78)
ANION GAP SERPL CALC-SCNC: 4 MMOL/L (ref 5–15)
AST SERPL W P-5'-P-CCNC: ABNORMAL U/L (ref 15–37)
BASOPHILS # BLD: 0 K/UL (ref 0–0.1)
BASOPHILS NFR BLD: 0 % (ref 0–1)
BILIRUB SERPL-MCNC: 0.8 MG/DL (ref 0.2–1)
BUN SERPL-MCNC: 24 MG/DL (ref 6–20)
BUN/CREAT SERPL: 22 (ref 12–20)
CA-I BLD-MCNC: 9.1 MG/DL (ref 8.5–10.1)
CHLORIDE SERPL-SCNC: 107 MMOL/L (ref 97–108)
CO2 SERPL-SCNC: 27 MMOL/L (ref 21–32)
CREAT SERPL-MCNC: 1.11 MG/DL (ref 0.7–1.3)
DIFFERENTIAL METHOD BLD: ABNORMAL
EKG ATRIAL RATE: 64 BPM
EKG DIAGNOSIS: NORMAL
EKG P AXIS: 13 DEGREES
EKG P-R INTERVAL: 166 MS
EKG Q-T INTERVAL: 456 MS
EKG QRS DURATION: 80 MS
EKG QTC CALCULATION (BAZETT): 470 MS
EKG R AXIS: 44 DEGREES
EKG T AXIS: 27 DEGREES
EKG VENTRICULAR RATE: 64 BPM
EOSINOPHIL # BLD: 0.2 K/UL (ref 0–0.4)
EOSINOPHIL NFR BLD: 4 % (ref 0–7)
ERYTHROCYTE [DISTWIDTH] IN BLOOD BY AUTOMATED COUNT: 14.7 % (ref 11.5–14.5)
GLOBULIN SER CALC-MCNC: 4 G/DL (ref 2–4)
GLUCOSE SERPL-MCNC: 89 MG/DL (ref 65–100)
HGB BLD-MCNC: 12.2 G/DL (ref 12.1–17)
IMM GRANULOCYTES # BLD AUTO: 0 K/UL (ref 0–0.04)
IMM GRANULOCYTES NFR BLD AUTO: 0 % (ref 0–0.5)
LYMPHOCYTES # BLD: 1.7 K/UL (ref 0.8–3.5)
LYMPHOCYTES NFR BLD: 35 % (ref 12–49)
MCH RBC QN AUTO: 31.3 PG (ref 26–34)
MCHC RBC AUTO-ENTMCNC: 32.7 G/DL (ref 30–36.5)
MCV RBC AUTO: 95.6 FL (ref 80–99)
MONOCYTES # BLD: 0.3 K/UL (ref 0–1)
MONOCYTES NFR BLD: 7 % (ref 5–13)
NEUTS SEG # BLD: 2.7 K/UL (ref 1.8–8)
NEUTS SEG NFR BLD: 54 % (ref 32–75)
NRBC # BLD: 0 K/UL (ref 0–0.01)
NRBC BLD-RTO: 0 PER 100 WBC
PLATELET # BLD AUTO: 108 K/UL (ref 150–400)
POTASSIUM SERPL-SCNC: ABNORMAL MMOL/L (ref 3.5–5.1)
PROT SERPL-MCNC: 7.3 G/DL (ref 6.4–8.2)
RBC # BLD AUTO: 3.9 M/UL (ref 4.1–5.7)
RBC MORPH BLD: ABNORMAL
SODIUM SERPL-SCNC: 138 MMOL/L (ref 136–145)
TROPONIN I SERPL HS-MCNC: 6 NG/L (ref 0–76)
WBC # BLD AUTO: 4.9 K/UL (ref 4.1–11.1)

## 2024-08-08 PROCEDURE — 36415 COLL VENOUS BLD VENIPUNCTURE: CPT

## 2024-08-08 PROCEDURE — 84484 ASSAY OF TROPONIN QUANT: CPT

## 2024-08-08 PROCEDURE — 80053 COMPREHEN METABOLIC PANEL: CPT

## 2024-08-08 PROCEDURE — 99285 EMERGENCY DEPT VISIT HI MDM: CPT

## 2024-08-08 PROCEDURE — 85025 COMPLETE CBC W/AUTO DIFF WBC: CPT

## 2024-08-08 PROCEDURE — 71045 X-RAY EXAM CHEST 1 VIEW: CPT

## 2024-08-08 PROCEDURE — 93005 ELECTROCARDIOGRAM TRACING: CPT | Performed by: EMERGENCY MEDICINE

## 2024-08-08 RX ORDER — DOXYCYCLINE HYCLATE 100 MG
100 TABLET ORAL 2 TIMES DAILY
Qty: 20 TABLET | Refills: 0 | Status: SHIPPED | OUTPATIENT
Start: 2024-08-08 | End: 2024-08-18

## 2024-08-08 RX ORDER — OFLOXACIN 3 MG/ML
SOLUTION/ DROPS OPHTHALMIC
Qty: 5 ML | Refills: 0 | Status: SHIPPED | OUTPATIENT
Start: 2024-08-08

## 2024-08-08 NOTE — ED PROVIDER NOTES
as: TYLENOL     * acetaminophen 325 MG tablet  Commonly known as: Aminofen  Take 2 tablets by mouth every 6 hours as needed for Pain     ibuprofen 600 MG tablet  Commonly known as: ADVIL;MOTRIN           * This list has 2 medication(s) that are the same as other medications prescribed for you. Read the directions carefully, and ask your doctor or other care provider to review them with you.                   Where to Get Your Medications        These medications were sent to [facility] [address] [place] [address] - P [phone] - F [phone]  [address] [place] [address]      Phone: [phone]   doxycycline hyclate 100 MG tablet  ofloxacin 0.3 % solution         DISCONTINUED MEDICATIONS:  Current Discharge Medication List          ED FINAL IMPRESSION     1. Acute viral conjunctivitis of both eyes    2. Acute non-recurrent frontal sinusitis           I am the primary provider of record. Evin Marquez DO (electronically signed)    (Please note that parts of this dictation were completed with voice recognition software. Quite often unanticipated grammatical, syntax, homophones, and other interpretive errors are inadvertently transcribed by the computer software. Please disregards these errors. Please excuse any errors that have escaped final proofreading.)       Evin Marquez DO  08/08/24 4377

## 2024-08-12 ENCOUNTER — HOSPITAL ENCOUNTER (EMERGENCY)
Facility: HOSPITAL | Age: 79
Discharge: HOME | End: 2024-08-12
Attending: EMERGENCY MEDICINE
Payer: MEDICARE

## 2024-08-12 VITALS
HEART RATE: 63 BPM | HEIGHT: 74 IN | OXYGEN SATURATION: 98 % | WEIGHT: 210 LBS | BODY MASS INDEX: 26.95 KG/M2 | TEMPERATURE: 97.6 F | SYSTOLIC BLOOD PRESSURE: 135 MMHG | RESPIRATION RATE: 18 BRPM | DIASTOLIC BLOOD PRESSURE: 66 MMHG

## 2024-08-12 DIAGNOSIS — R35.1 NOCTURIA: ICD-10-CM

## 2024-08-12 DIAGNOSIS — M20.42 HAMMERTOE, BILATERAL: Primary | ICD-10-CM

## 2024-08-12 DIAGNOSIS — M20.41 HAMMERTOE, BILATERAL: Primary | ICD-10-CM

## 2024-08-12 PROCEDURE — 6370000000 HC RX 637 (ALT 250 FOR IP): Performed by: EMERGENCY MEDICINE

## 2024-08-12 PROCEDURE — 99283 EMERGENCY DEPT VISIT LOW MDM: CPT

## 2024-08-12 RX ORDER — ACETAMINOPHEN 500 MG
1000 TABLET ORAL
Status: COMPLETED | OUTPATIENT
Start: 2024-08-12 | End: 2024-08-12

## 2024-08-12 RX ORDER — ACETAMINOPHEN 500 MG
1000 TABLET ORAL
Qty: 20 TABLET | Refills: 0 | Status: SHIPPED | OUTPATIENT
Start: 2024-08-12

## 2024-08-12 RX ORDER — GABAPENTIN 100 MG/1
100 CAPSULE ORAL 3 TIMES DAILY
Status: DISCONTINUED | OUTPATIENT
Start: 2024-08-12 | End: 2024-08-13 | Stop reason: HOSPADM

## 2024-08-12 RX ADMIN — GABAPENTIN 100 MG: 100 CAPSULE ORAL at 21:36

## 2024-08-12 RX ADMIN — ACETAMINOPHEN 1000 MG: 500 TABLET ORAL at 21:36

## 2024-08-12 ASSESSMENT — PAIN - FUNCTIONAL ASSESSMENT: PAIN_FUNCTIONAL_ASSESSMENT: 0-10

## 2024-08-12 ASSESSMENT — LIFESTYLE VARIABLES
HOW MANY STANDARD DRINKS CONTAINING ALCOHOL DO YOU HAVE ON A TYPICAL DAY: 1 OR 2
HOW OFTEN DO YOU HAVE A DRINK CONTAINING ALCOHOL: 2-4 TIMES A MONTH

## 2024-08-12 ASSESSMENT — PAIN DESCRIPTION - ORIENTATION: ORIENTATION: RIGHT;LEFT

## 2024-08-12 ASSESSMENT — PAIN DESCRIPTION - LOCATION: LOCATION: FOOT

## 2024-08-12 NOTE — ED TRIAGE NOTES
BIB for c/o bilateral foot pain for \"a long time.\"  Denies injuries.  Reports history of bunions.

## 2024-08-13 NOTE — DISCHARGE INSTRUCTIONS
Please call the podiatrist to follow-up regarding your feet.  They are a specialist that can help you further.  I also included instructions on a urologist that can address the nighttime urination issues.  If you experience any other symptoms or concerns please do not hesitate to return.        Thank you for choosing our Emergency Department for your care.  It is our privilege to care for you in your time of need.  In the next several days, you may receive a survey via email or mailed to your home about your experience with our team.  We would greatly appreciate you taking a few minutes to complete the survey, as we use this information to learn what we have done well and what we could be doing better. Thank you for trusting us with your care!    Below you will find a list of your tests from today's visit.   Labs  No results found for this or any previous visit (from the past 12 hour(s)).    Radiologic Studies  No orders to display     ------------------------------------------------------------------------------------------------------------  The evaluation and treatment you received in the Emergency Department were for an urgent problem. It is important that you follow-up with a doctor, nurse practitioner, or physician assistant to:  (1) confirm your diagnosis,  (2) re-evaluation of changes in your illness and treatment, and (3) for ongoing care. Please take your discharge instructions with you when you go to your follow-up appointment.     If you have any problem arranging a follow-up appointment, contact us!  If your symptoms become worse or you do not improve as expected, please return to us. We are available 24 hours a day.     If a prescription has been provided, please fill it as soon as possible to prevent a delay in treatment. If you have any questions or reservations about taking the medication due to side effects or interactions with other medications, please call your primary care provider or contact us

## 2024-08-13 NOTE — ED PROVIDER NOTES
[facility] EMERGENCY DEPT  EMERGENCY DEPARTMENT HISTORY AND PHYSICAL EXAM        Pt Name: Kieran Braun  MRN: [id number]  Birthdate 1945  Date of evaluation: 8/12/2024  Provider: Porfirio Ramos DO      History of Presenting Illness         Chief Complaint   Patient presents with    Hand Pain    Foot Pain       History was provided by: Patient    Location/Duration/Severity/Modifying factors   Kieran Braun is a 79 y.o. male who arrived to the emergency department by by private vehicle with a complaint Hand Pain and Foot Pain        Patient is a 79-year-old male with history of homelessness, although he denies any other medical problems presenting to the ED with a chief complaint of bilateral foot pain.  He says has been going on for months to years, he says she has had the problem with his feet really since he was a child.  He says it hurts along the medial aspect over a bunion area.  He also complains of bilateral hand pain and numbness has been going on for years he notes an issue with frequent nighttime urination that has been going on for at least several months possibly years.  He notes his eyes appear to be itching and draining which he was seen for a few days ago and given eyedrops          There are no other complaints, changes, or physical findings at this time.    PCP: No primary care provider on file.    Current Facility-Administered Medications   Medication Dose Route Frequency Provider Last Rate Last Admin    gabapentin (NEURONTIN) capsule 100 mg  100 mg Oral TID Porfirio Ramos DO   100 mg at 08/12/24 2303     Current Outpatient Medications   Medication Sig Dispense Refill    acetaminophen (TYLENOL) 500 MG tablet Take 2 tablets by mouth every 6-8 hours as needed for Pain 20 tablet 0    ofloxacin (OCUFLOX) 0.3 % solution 2 drops every 4 hours for 7 days 5 mL 0    doxycycline hyclate (VIBRA-TABS) 100 MG tablet Take 1 tablet by mouth 2 times daily for 10 days 20 tablet 0    acetaminophen (AMINOFEN) 325 MG

## 2024-12-05 ENCOUNTER — APPOINTMENT (OUTPATIENT)
Facility: HOSPITAL | Age: 79
End: 2024-12-05
Payer: MEDICARE

## 2024-12-05 ENCOUNTER — HOSPITAL ENCOUNTER (EMERGENCY)
Facility: HOSPITAL | Age: 79
Discharge: HOME | End: 2024-12-05
Attending: EMERGENCY MEDICINE
Payer: MEDICARE

## 2024-12-05 VITALS
WEIGHT: 210.1 LBS | RESPIRATION RATE: 18 BRPM | SYSTOLIC BLOOD PRESSURE: 125 MMHG | BODY MASS INDEX: 26.98 KG/M2 | DIASTOLIC BLOOD PRESSURE: 78 MMHG | OXYGEN SATURATION: 100 % | HEART RATE: 77 BPM | TEMPERATURE: 98 F

## 2024-12-05 DIAGNOSIS — M17.0 ARTHRITIS OF BOTH KNEES: Primary | ICD-10-CM

## 2024-12-05 PROCEDURE — 6370000000 HC RX 637 (ALT 250 FOR IP): Performed by: EMERGENCY MEDICINE

## 2024-12-05 PROCEDURE — 99283 EMERGENCY DEPT VISIT LOW MDM: CPT

## 2024-12-05 PROCEDURE — 73562 X-RAY EXAM OF KNEE 3: CPT

## 2024-12-05 RX ORDER — NAPROXEN 500 MG/1
500 TABLET ORAL
Status: COMPLETED | OUTPATIENT
Start: 2024-12-05 | End: 2024-12-05

## 2024-12-05 RX ADMIN — NAPROXEN 500 MG: 500 TABLET ORAL at 17:20

## 2024-12-05 ASSESSMENT — LIFESTYLE VARIABLES
HOW MANY STANDARD DRINKS CONTAINING ALCOHOL DO YOU HAVE ON A TYPICAL DAY: PATIENT DECLINED
HOW OFTEN DO YOU HAVE A DRINK CONTAINING ALCOHOL: PATIENT DECLINED

## 2024-12-05 ASSESSMENT — PAIN SCALES - GENERAL
PAINLEVEL_OUTOF10: 5
PAINLEVEL_OUTOF10: 5
PAINLEVEL_OUTOF10: 4

## 2024-12-05 NOTE — ED PROVIDER NOTES
[facility] EMERGENCY DEPT  EMERGENCY DEPARTMENT HISTORY AND PHYSICAL EXAM      Date: 12/5/2024  Patient Name: Kieran Braun  MRN: [id number]  YOB: 1945  Date of evaluation: 12/5/2024  Provider: Hari Ramirez MD   Note Started: 5:01 PM EST 12/5/24    HISTORY OF PRESENT ILLNESS   No chief complaint on file.      History Provided By: Patient    HPI: Kieran Braun is a 79 y.o. male with a history of arthritis, presenting with bilateral knee and ankle pain.  Patient states that he has had this issue for past several months but it hurt worse today.  Has not taking anything for it.  Denies any trauma or falls.    PAST MEDICAL HISTORY   Past Medical History:  Past Medical History:   Diagnosis Date    Arthritis        Past Surgical History:  History reviewed. No pertinent surgical history.    Family History:  History reviewed. No pertinent family history.    Social History:  Social History     Tobacco Use    Smoking status: Some Days     Types: Cigarettes   Substance Use Topics    Alcohol use: Yes     Comment: occasionally    Drug use: Never       Allergies:  No Known Allergies    PCP: No primary care provider on file.    Current Meds:   No current facility-administered medications for this encounter.     Current Outpatient Medications   Medication Sig Dispense Refill    acetaminophen (TYLENOL) 500 MG tablet Take 2 tablets by mouth every 6-8 hours as needed for Pain 20 tablet 0    ofloxacin (OCUFLOX) 0.3 % solution 2 drops every 4 hours for 7 days 5 mL 0    acetaminophen (AMINOFEN) 325 MG tablet Take 2 tablets by mouth every 6 hours as needed for Pain 120 tablet 3    acetaminophen (TYLENOL) 325 MG tablet Take 2 tablets by mouth every 4 hours as needed      ibuprofen (ADVIL;MOTRIN) 600 MG tablet Take 1 tablet by mouth every 6 hours as needed         Social Determinants of Health:   Social Determinants of Health     Tobacco Use: High Risk (12/5/2024)    Patient History     Smoking Tobacco Use: Some Days     Smokeless

## 2024-12-05 NOTE — DISCHARGE INSTRUCTIONS
Thank you for choosing our Emergency Department for your care.  It is our privilege to care for you in your time of need.  In the next several days, you may receive a survey via email or mailed to your home about your experience with our team.  We would greatly appreciate you taking a few minutes to complete the survey, as we use this information to learn what we have done well and what we could be doing better. Thank you for trusting us with your care!    Below you will find a list of your tests from today's visit.   Labs  No results found for this or any previous visit (from the past 12 hour(s)).    Radiologic Studies  XR Knee Bilateral Limited    (Results Pending)     ------------------------------------------------------------------------------------------------------------  The evaluation and treatment you received in the Emergency Department were for an urgent problem. It is important that you follow-up with a doctor, nurse practitioner, or physician assistant to:  (1) confirm your diagnosis,  (2) re-evaluation of changes in your illness and treatment, and (3) for ongoing care. Please take your discharge instructions with you when you go to your follow-up appointment.     If you have any problem arranging a follow-up appointment, contact us!  If your symptoms become worse or you do not improve as expected, please return to us. We are available 24 hours a day.     If a prescription has been provided, please fill it as soon as possible to prevent a delay in treatment. If you have any questions or reservations about taking the medication due to side effects or interactions with other medications, please call your primary care provider or contact us directly.  Again, THANK YOU for choosing us to care for YOU!

## 2024-12-05 NOTE — ED TRIAGE NOTES
C/O bilateral knee and foot pain that is chronic. States he checked in because pain today is worse. Ambulatory. Denies injury.

## 2024-12-05 NOTE — ED NOTES
Pt's doctors office([facility]) called to get updated contact information, pt agreed to provide the doctor's office with his updated phone number. Dr's office intends to follow up upon discharge.

## 2024-12-06 ENCOUNTER — HOSPITAL ENCOUNTER (EMERGENCY)
Facility: HOSPITAL | Age: 79
Discharge: HOME | End: 2024-12-06
Attending: STUDENT IN AN ORGANIZED HEALTH CARE EDUCATION/TRAINING PROGRAM
Payer: MEDICARE

## 2024-12-06 ENCOUNTER — HOSPITAL ENCOUNTER (EMERGENCY)
Facility: HOSPITAL | Age: 79
Discharge: HOME | End: 2024-12-06
Payer: MEDICARE

## 2024-12-06 VITALS
SYSTOLIC BLOOD PRESSURE: 129 MMHG | OXYGEN SATURATION: 100 % | HEART RATE: 86 BPM | RESPIRATION RATE: 15 BRPM | TEMPERATURE: 97.3 F | DIASTOLIC BLOOD PRESSURE: 85 MMHG

## 2024-12-06 VITALS
OXYGEN SATURATION: 100 % | TEMPERATURE: 97.7 F | DIASTOLIC BLOOD PRESSURE: 81 MMHG | SYSTOLIC BLOOD PRESSURE: 140 MMHG | RESPIRATION RATE: 18 BRPM | HEART RATE: 74 BPM

## 2024-12-06 DIAGNOSIS — M19.042 OSTEOARTHRITIS OF FINGERS OF HANDS, BILATERAL: Primary | ICD-10-CM

## 2024-12-06 DIAGNOSIS — M54.9 CHRONIC BILATERAL BACK PAIN, UNSPECIFIED BACK LOCATION: ICD-10-CM

## 2024-12-06 DIAGNOSIS — M19.041 OSTEOARTHRITIS OF FINGERS OF HANDS, BILATERAL: Primary | ICD-10-CM

## 2024-12-06 DIAGNOSIS — H10.9 CONJUNCTIVITIS OF BOTH EYES, UNSPECIFIED CONJUNCTIVITIS TYPE: Primary | ICD-10-CM

## 2024-12-06 DIAGNOSIS — G89.29 CHRONIC BILATERAL BACK PAIN, UNSPECIFIED BACK LOCATION: ICD-10-CM

## 2024-12-06 PROCEDURE — 6370000000 HC RX 637 (ALT 250 FOR IP)

## 2024-12-06 PROCEDURE — 99283 EMERGENCY DEPT VISIT LOW MDM: CPT

## 2024-12-06 PROCEDURE — 6370000000 HC RX 637 (ALT 250 FOR IP): Performed by: STUDENT IN AN ORGANIZED HEALTH CARE EDUCATION/TRAINING PROGRAM

## 2024-12-06 RX ORDER — NAPROXEN 500 MG/1
500 TABLET ORAL
Status: COMPLETED | OUTPATIENT
Start: 2024-12-06 | End: 2024-12-06

## 2024-12-06 RX ORDER — POLYMYXIN B SULFATE AND TRIMETHOPRIM 1; 10000 MG/ML; [USP'U]/ML
1 SOLUTION OPHTHALMIC EVERY 4 HOURS
Qty: 3 ML | Refills: 0 | Status: SHIPPED | OUTPATIENT
Start: 2024-12-06 | End: 2024-12-16

## 2024-12-06 RX ORDER — TETRACAINE HYDROCHLORIDE 5 MG/ML
2 SOLUTION OPHTHALMIC ONCE
Status: COMPLETED | OUTPATIENT
Start: 2024-12-06 | End: 2024-12-06

## 2024-12-06 RX ORDER — NAPROXEN 500 MG/1
500 TABLET ORAL 2 TIMES DAILY WITH MEALS
Qty: 20 TABLET | Refills: 0 | Status: SHIPPED | OUTPATIENT
Start: 2024-12-06 | End: 2024-12-12

## 2024-12-06 RX ORDER — LIDOCAINE 4 G/G
1 PATCH TOPICAL
Status: DISCONTINUED | OUTPATIENT
Start: 2024-12-06 | End: 2024-12-06 | Stop reason: HOSPADM

## 2024-12-06 RX ADMIN — TETRACAINE HYDROCHLORIDE 2 DROP: 5 SOLUTION OPHTHALMIC at 21:12

## 2024-12-06 RX ADMIN — NAPROXEN 500 MG: 500 TABLET ORAL at 19:58

## 2024-12-06 ASSESSMENT — PAIN DESCRIPTION - LOCATION: LOCATION: HAND

## 2024-12-06 ASSESSMENT — PAIN SCALES - GENERAL: PAINLEVEL_OUTOF10: 10

## 2024-12-06 NOTE — ED TRIAGE NOTES
[facility] pt stated that he is having back pain and achy fingers for a while but it has gotten worse in the last few days

## 2024-12-07 ENCOUNTER — HOSPITAL ENCOUNTER (EMERGENCY)
Facility: HOSPITAL | Age: 79
Discharge: HOME | End: 2024-12-07
Payer: MEDICARE

## 2024-12-07 VITALS
DIASTOLIC BLOOD PRESSURE: 80 MMHG | RESPIRATION RATE: 18 BRPM | SYSTOLIC BLOOD PRESSURE: 152 MMHG | HEIGHT: 74 IN | BODY MASS INDEX: 26.95 KG/M2 | WEIGHT: 210 LBS | TEMPERATURE: 98.2 F | HEART RATE: 88 BPM | OXYGEN SATURATION: 99 %

## 2024-12-07 DIAGNOSIS — M19.041 ARTHRITIS OF BOTH HANDS: Primary | ICD-10-CM

## 2024-12-07 DIAGNOSIS — M19.042 ARTHRITIS OF BOTH HANDS: Primary | ICD-10-CM

## 2024-12-07 DIAGNOSIS — M19.071 ARTHRITIS OF BOTH FEET: ICD-10-CM

## 2024-12-07 DIAGNOSIS — M19.072 ARTHRITIS OF BOTH FEET: ICD-10-CM

## 2024-12-07 PROCEDURE — 99283 EMERGENCY DEPT VISIT LOW MDM: CPT

## 2024-12-07 PROCEDURE — 6370000000 HC RX 637 (ALT 250 FOR IP)

## 2024-12-07 RX ORDER — ACETAMINOPHEN 500 MG
500 TABLET ORAL EVERY 6 HOURS PRN
Qty: 30 TABLET | Refills: 0 | Status: SHIPPED | OUTPATIENT
Start: 2024-12-07

## 2024-12-07 RX ORDER — ACETAMINOPHEN 500 MG
1000 TABLET ORAL
Status: COMPLETED | OUTPATIENT
Start: 2024-12-07 | End: 2024-12-07

## 2024-12-07 RX ADMIN — ACETAMINOPHEN 1000 MG: 500 TABLET ORAL at 20:01

## 2024-12-07 ASSESSMENT — PAIN DESCRIPTION - LOCATION: LOCATION: HAND;FOOT

## 2024-12-07 ASSESSMENT — PAIN SCALES - GENERAL: PAINLEVEL_OUTOF10: 10

## 2024-12-07 ASSESSMENT — LIFESTYLE VARIABLES
HOW OFTEN DO YOU HAVE A DRINK CONTAINING ALCOHOL: NEVER
HOW MANY STANDARD DRINKS CONTAINING ALCOHOL DO YOU HAVE ON A TYPICAL DAY: PATIENT DOES NOT DRINK

## 2024-12-07 ASSESSMENT — PAIN DESCRIPTION - ORIENTATION: ORIENTATION: RIGHT;LEFT

## 2024-12-07 NOTE — ED PROVIDER NOTES
[facility] EMERGENCY DEPT  EMERGENCY DEPARTMENT HISTORY AND PHYSICAL EXAM      Date: 12/6/2024  Patient Name: Kieran Braun  MRN: [id number]  YOB: 1945  Date of evaluation: 12/6/2024  Provider: Felice Romero MD   Note Started: 9:19 PM EST 12/6/24    HISTORY OF PRESENT ILLNESS     Chief Complaint   Patient presents with    Eye Pain       History Provided By: Patient    HPI: Kieran Braun is a 79 y.o. male presents to emergency department for bilateral eye pain.  Patient denies any acute injury denies any visual disturbance, no particulate.  Does not wear contacts or glasses.    PAST MEDICAL HISTORY   Past Medical History:  Past Medical History:   Diagnosis Date    Arthritis        Past Surgical History:  History reviewed. No pertinent surgical history.    Family History:  History reviewed. No pertinent family history.    Social History:  Social History     Tobacco Use    Smoking status: Some Days     Types: Cigarettes   Substance Use Topics    Alcohol use: Yes     Comment: occasionally    Drug use: Never       Allergies:  No Known Allergies    PCP: No primary care provider on file.    Current Meds:   No current facility-administered medications for this encounter.     Current Outpatient Medications   Medication Sig Dispense Refill    trimethoprim-polymyxin b (POLYTRIM) 04124-2.1 UNIT/ML-% ophthalmic solution Place 1 drop into both eyes every 4 hours for 10 days 3 mL 0    naproxen (NAPROSYN) 500 MG tablet Take 1 tablet by mouth 2 times daily (with meals) 20 tablet 0    acetaminophen (TYLENOL) 500 MG tablet Take 2 tablets by mouth every 6-8 hours as needed for Pain 20 tablet 0    ofloxacin (OCUFLOX) 0.3 % solution 2 drops every 4 hours for 7 days 5 mL 0    acetaminophen (AMINOFEN) 325 MG tablet Take 2 tablets by mouth every 6 hours as needed for Pain 120 tablet 3    acetaminophen (TYLENOL) 325 MG tablet Take 2 tablets by mouth every 4 hours as needed      ibuprofen (ADVIL;MOTRIN) 600 MG tablet Take 1 tablet by  Status: He is alert.      Motor: No weakness.      Gait: Gait normal.           SCREENINGS                  LAB, EKG AND DIAGNOSTIC RESULTS   Labs:  No results found for this or any previous visit (from the past 12 hour(s)).    EKG: Not Applicable    Radiologic Studies:  Non-plain film images such as CT, Ultrasound and MRI are read by the radiologist. Plain radiographic images are visualized and preliminarily interpreted by the ED Physician with the following findings: Not Applicable.    Interpretation per the Radiologist below, if available at the time of this note:  No orders to display        ED COURSE and DIFFERENTIAL DIAGNOSIS/MDM   9:22 PM Differential and Considerations: 79-year-old male, no significant past medical history presents the emerged part for bilateral eye burning.    Physical shows unkempt male however in no distress, bilateral conjunctival injection noted no significant drainage.    Differential includes allergic conjunctivitis, viral conjunctivitis, bacterial conjunctivitis, low suspicion for abrasion or foreign body aspiration on any significant pain.    Will apply tetracaine, discharged home with prescription for Polytrim.    Records Reviewed (source and summary of external notes): Prior medical records and Nursing notes    Vitals:    Vitals:    12/06/24 2101   BP: (!) 140/81   Pulse: 74   Resp: 18   Temp: 97.7 °F (36.5 °C)   TempSrc: Oral   SpO2: 100%        ED COURSE       SEPSIS Reassessment: Sepsis reassessment not applicable    Clinical Management Tools:  Not Applicable    Patient was given the following medications:  Medications   tetracaine (TETRAVISC) 0.5 % ophthalmic solution 2 drop (2 drops Both Eyes Given 12/6/24 2112)       CONSULTS: See ED Course/MDM for further details.  None     Social Determinants affecting Diagnosis/Treatment: None    Smoking Cessation: Not Applicable    PROCEDURES   Unless otherwise noted above, none.  Procedures      CRITICAL CARE TIME   Patient does not

## 2024-12-07 NOTE — ED PROVIDER NOTES
bilaterally.    Mild tenderness on musculature throughout the entirety of the back and neck without any specific midline tenderness, step-off, deformity, erythema or swelling noted.  Neurovascular intact in all extremities, ambulating without difficulty.  2+ PT pulse bilaterally.   Skin:     General: Skin is warm.      Capillary Refill: Capillary refill takes less than 2 seconds.   Neurological:      General: No focal deficit present.      Mental Status: He is alert and oriented to person, place, and time.       SCREENINGS                  LAB, EKG AND DIAGNOSTIC RESULTS   Labs:  No results found for this or any previous visit (from the past 12 hour(s)).    EKG: Not Applicable    Radiologic Studies:  Non-plain film images such as CT, Ultrasound and MRI are read by the radiologist. Plain radiographic images are visualized and preliminarily interpreted by the ED Physician with the following findings: Not Applicable.    Interpretation per the Radiologist below, if available at the time of this note:  No orders to display        ED COURSE and DIFFERENTIAL DIAGNOSIS/MDM   7:41 PM Differential and Considerations:     Patient presents for evaluation of bilateral hand pain and generalized back pain that has been going on for years.  Patient states that while it is cold outside has been worsening today.  He denies any falls or trauma.  Physical exam is consistent with evaluation of chronic pain, he has no evidence of acute fracture or dislocation, no red flags and a benign physical exam.  Will plan to treat with outpatient naproxen, lidocaine patch, and follow-up.  We discussed red flag and return precautions in regards any new or worsening symptoms.  Patient verbalized understanding's and agreement with the treatment in place and disposition home at this time.    Records Reviewed (source and summary of external notes): Prior medical records and Nursing notes    Vitals:    Vitals:    12/06/24 1853   BP: 129/85   Pulse: 86  hours for 7 days           * This list has 3 medication(s) that are the same as other medications prescribed for you. Read the directions carefully, and ask your doctor or other care provider to review them with you.                   Where to Get Your Medications        These medications were sent to [facility] [address] [place] [address] - P [phone] - F [phone]  [address] [place] [address]      Phone: [phone]   naproxen 500 MG tablet           DISCONTINUED MEDICATIONS:  Discharge Medication List as of 12/6/2024  8:21 PM          I am the Primary Clinician of Record: Marie Romero PA-C (electronically signed)    (Please note that parts of this dictation were completed with voice recognition software. Quite often unanticipated grammatical, syntax, homophones, and other interpretive errors are inadvertently transcribed by the computer software. Please disregards these errors. Please excuse any errors that have escaped final proofreading.)     Marie Romero PA-C  12/07/24 0031

## 2024-12-08 NOTE — ED PROVIDER NOTES
new or worsening of symptoms.  Patient verbalized understanding is in agreement with the treatment in place and disposition home at this time.    Records Reviewed (source and summary of external notes): Prior medical records and Nursing notes    Vitals:    Vitals:    12/07/24 1934 12/07/24 1935   BP: (!) 152/80    Pulse: 88    Resp: 18    Temp: 98.2 °F (36.8 °C)    SpO2: 99%    Weight:  95.3 kg (210 lb)   Height:  1.88 m (6' 2\")        ED COURSE       SEPSIS Reassessment: Sepsis reassessment not applicable    Clinical Management Tools:  Not Applicable    Patient was given the following medications:  Medications   acetaminophen (TYLENOL) tablet 1,000 mg (1,000 mg Oral Given 12/7/24 2001)       CONSULTS: See ED Course/MDM for further details.  None     Social Determinants affecting Diagnosis/Treatment: None    Smoking Cessation: Not Applicable    PROCEDURES   Unless otherwise noted above, none.  Procedures      CRITICAL CARE TIME   Patient does not meet Critical Care Time, 0 minutes    ED IMPRESSION     1. Arthritis of both hands    2. Arthritis of both feet          DISPOSITION/PLAN   DISPOSITION Decision To Discharge 12/07/2024 08:40:21 PM   DISPOSITION CONDITION Stable        Discharge Note: The patient is stable for discharge home. The signs, symptoms, diagnosis, and discharge instructions have been discussed, understanding conveyed, and agreed upon. The patient is to follow up as recommended or return to ER should their symptoms worsen.      PATIENT REFERRED TO:  Maria Esther Gould MD  [facility]   [facility] [id number] [phone]      PCP        DISCHARGE MEDICATIONS:     Medication List        CHANGE how you take these medications      * acetaminophen 325 MG tablet  Commonly known as: TYLENOL  What changed: Another medication with the same name was added. Make sure you understand how and when to take each.     * acetaminophen 325 MG tablet  Commonly known as: Aminofen  Take 2 tablets by mouth

## 2024-12-12 ENCOUNTER — HOSPITAL ENCOUNTER (EMERGENCY)
Facility: HOSPITAL | Age: 79
Discharge: HOME | End: 2024-12-12
Payer: MEDICARE

## 2024-12-12 VITALS
DIASTOLIC BLOOD PRESSURE: 78 MMHG | BODY MASS INDEX: 26.95 KG/M2 | SYSTOLIC BLOOD PRESSURE: 129 MMHG | WEIGHT: 210 LBS | RESPIRATION RATE: 18 BRPM | HEIGHT: 74 IN | TEMPERATURE: 98.2 F | HEART RATE: 86 BPM | OXYGEN SATURATION: 100 %

## 2024-12-12 DIAGNOSIS — M19.041 ARTHRITIS OF BOTH HANDS: Primary | ICD-10-CM

## 2024-12-12 DIAGNOSIS — M19.072 ARTHRITIS OF BOTH FEET: ICD-10-CM

## 2024-12-12 DIAGNOSIS — M19.042 ARTHRITIS OF BOTH HANDS: Primary | ICD-10-CM

## 2024-12-12 DIAGNOSIS — M19.071 ARTHRITIS OF BOTH FEET: ICD-10-CM

## 2024-12-12 PROCEDURE — 6370000000 HC RX 637 (ALT 250 FOR IP)

## 2024-12-12 PROCEDURE — 99283 EMERGENCY DEPT VISIT LOW MDM: CPT

## 2024-12-12 RX ORDER — NAPROXEN 500 MG/1
500 TABLET ORAL 2 TIMES DAILY WITH MEALS
Qty: 60 TABLET | Refills: 0 | Status: SHIPPED | OUTPATIENT
Start: 2024-12-12 | End: 2025-01-11

## 2024-12-12 RX ORDER — ACETAMINOPHEN 500 MG
500 TABLET ORAL EVERY 6 HOURS PRN
Qty: 120 TABLET | Refills: 0 | Status: SHIPPED | OUTPATIENT
Start: 2024-12-12 | End: 2025-01-11

## 2024-12-12 RX ORDER — ACETAMINOPHEN 500 MG
1000 TABLET ORAL
Status: COMPLETED | OUTPATIENT
Start: 2024-12-12 | End: 2024-12-12

## 2024-12-12 RX ADMIN — ACETAMINOPHEN 1000 MG: 500 TABLET ORAL at 21:35

## 2024-12-12 ASSESSMENT — PAIN DESCRIPTION - LOCATION: LOCATION: HAND;FOOT

## 2024-12-12 ASSESSMENT — PAIN SCALES - GENERAL: PAINLEVEL_OUTOF10: 10

## 2024-12-13 ENCOUNTER — HOSPITAL ENCOUNTER (EMERGENCY)
Facility: HOSPITAL | Age: 79
Discharge: HOME | End: 2024-12-13
Attending: STUDENT IN AN ORGANIZED HEALTH CARE EDUCATION/TRAINING PROGRAM
Payer: MEDICARE

## 2024-12-13 VITALS
SYSTOLIC BLOOD PRESSURE: 121 MMHG | DIASTOLIC BLOOD PRESSURE: 73 MMHG | HEIGHT: 74 IN | OXYGEN SATURATION: 97 % | RESPIRATION RATE: 18 BRPM | TEMPERATURE: 97.6 F | HEART RATE: 73 BPM | BODY MASS INDEX: 26.95 KG/M2 | WEIGHT: 210 LBS

## 2024-12-13 DIAGNOSIS — Z59.00 HOMELESSNESS: Primary | ICD-10-CM

## 2024-12-13 DIAGNOSIS — M19.90 ARTHRITIS: ICD-10-CM

## 2024-12-13 PROCEDURE — 99283 EMERGENCY DEPT VISIT LOW MDM: CPT

## 2024-12-13 PROCEDURE — 6370000000 HC RX 637 (ALT 250 FOR IP): Performed by: STUDENT IN AN ORGANIZED HEALTH CARE EDUCATION/TRAINING PROGRAM

## 2024-12-13 RX ORDER — ACETAMINOPHEN 325 MG/1
650 TABLET ORAL
Status: COMPLETED | OUTPATIENT
Start: 2024-12-13 | End: 2024-12-13

## 2024-12-13 RX ADMIN — ACETAMINOPHEN 650 MG: 325 TABLET ORAL at 22:16

## 2024-12-13 SDOH — ECONOMIC STABILITY - HOUSING INSECURITY: HOMELESSNESS UNSPECIFIED: Z59.00

## 2024-12-13 ASSESSMENT — PAIN - FUNCTIONAL ASSESSMENT: PAIN_FUNCTIONAL_ASSESSMENT: 0-10

## 2024-12-13 ASSESSMENT — PAIN SCALES - GENERAL: PAINLEVEL_OUTOF10: 10

## 2024-12-13 NOTE — DISCHARGE INSTRUCTIONS
______________________________________________________________________  [place] [facility]    For women and children  [address] [place], [place]  [phone]  Opens at 10am    The continuum of supportive services in housing plans may include, but are not limited to:  Referrals to job finding assistance agencies  Budgeting and money management workshops  Mental health and substance abuse screening and counseling  Domestic violence and sexual abuse counseling  Parenting and nutrition instruction  ______________________________________________________________________  [place] [facility]    Now open to residents 18 years and older.  Location: [address] [place] [address] in [place]  Will accept guests seeking cold refuge starting at 8pm throughout the winter months.   Guests must be onsite and signed in by 10pm.   Shelter closes every morning at 8am.    For more information contact Brenna Thomas at:  [email or web address] or call [phone]  ______________________________________________________________________   [facility]    Open nightly 7pm to 7am from November to April  Located in the Ballroom at [facility]: [address] [place], [place]  Entrance is on the side of the building to the left of the main entrance.   You can arrive as early as 6pm and  line. Check-in is 7pm to 9pm. They offer warm dinner and bagged breakfast.  Contact: [phone]  ______________________________________________________________________            ADDITIONAL RESOURCES:      ______________________________________________________________________  [facility]    Call for assistance with social work  [phone]  ______________________________________________________________________

## 2024-12-14 NOTE — ED TRIAGE NOTES
Reports bilateral knee pain, rt neck pain, pain/numbness in bilateral hands. Reports symptoms for \"quite awhile\"

## 2024-12-14 NOTE — DISCHARGE INSTRUCTIONS
______________________________________________________________________  [place] [facility] ([facility]    For women and children  [address] [place], [place]  [phone]  Opens at 10am    The continuum of supportive services in housing plans may include, but are not limited to:  Referrals to job finding assistance agencies  Budgeting and money management workshops  Mental health and substance abuse screening and counseling  Domestic violence and sexual abuse counseling  Parenting and nutrition instruction  ______________________________________________________________________  [place] [facility]    Now open to residents 18 years and older.  Location: [address] [place] [address] in [place]  Will accept guests seeking cold refuge starting at 8pm throughout the winter months.   Guests must be onsite and signed in by 10pm.   Shelter closes every morning at 8am.    For more information contact Brenna Thomas at:  [email or web address] or call [phone]  ______________________________________________________________________   [facility]    Open nightly 7pm to 7am from November to April  Located in the Ballroom at [facility]: [address] [place], [place]  Entrance is on the side of the building to the left of the main entrance.   You can arrive as early as 6pm and  line. Check-in is 7pm to 9pm. They offer warm dinner and bagged breakfast.  Contact: [phone]  ______________________________________________________________________            ADDITIONAL RESOURCES:      ______________________________________________________________________  [place]    Call for assistance with social work  [phone]  ______________________________________________________________________

## 2024-12-14 NOTE — ED PROVIDER NOTES
[facility] EMERGENCY DEPT  EMERGENCY DEPARTMENT HISTORY AND PHYSICAL EXAM      Date: 12/13/2024  Patient Name: Kieran Braun  MRN: [id number]  Birthdate 1945  Date of evaluation: 12/13/2024  Provider: Brock Amador MD   Note Started: 10:14 PM EST 12/13/24    HISTORY OF PRESENT ILLNESS     Chief Complaint   Patient presents with    Pain       History Provided By: Patient    HPI: Kieran Braun is a 79 y.o. male with past medical history of arthritis presents for evaluation of joint pain.  Patient states that he is currently homeless, and is attempting to get through the next 2 weeks until he will be able to go to [facility].  States he has been wandering around, going to [facility] today.  Endorses chronic pain in bilateral hands, knees and feet.  He takes Tylenol sometimes for this, he is not sure if he took any today.  No recent trauma    PAST MEDICAL HISTORY   Past Medical History:  Past Medical History:   Diagnosis Date    Arthritis        Past Surgical History:  History reviewed. No pertinent surgical history.    Family History:  History reviewed. No pertinent family history.    Social History:  Social History     Tobacco Use    Smoking status: Some Days     Types: Cigarettes   Substance Use Topics    Alcohol use: Yes     Comment: occasionally    Drug use: Never       Allergies:  No Known Allergies    PCP: No primary care provider on file.    Current Meds:   Current Facility-Administered Medications   Medication Dose Route Frequency Provider Last Rate Last Admin    acetaminophen (TYLENOL) tablet 650 mg  650 mg Oral NOW Brock Amador MD         Current Outpatient Medications   Medication Sig Dispense Refill    naproxen (NAPROSYN) 500 MG tablet Take 1 tablet by mouth 2 times daily (with meals) 60 tablet 0    acetaminophen (TYLENOL) 500 MG tablet Take 1 tablet by mouth every 6 hours as needed for Pain 120 tablet 0    acetaminophen (TYLENOL) 500 MG tablet Take 1 tablet by mouth every 6 hours as needed  ophthalmic solution  Commonly known as: Polytrim  Place 1 drop into both eyes every 4 hours for 10 days           * This list has 4 medication(s) that are the same as other medications prescribed for you. Read the directions carefully, and ask your doctor or other care provider to review them with you.                    DISCONTINUED MEDICATIONS:  Current Discharge Medication List          I am the Primary Clinician of Record. Brock Amador MD (electronically signed)    (Please note that parts of this dictation were completed with voice recognition software. Quite often unanticipated grammatical, syntax, homophones, and other interpretive errors are inadvertently transcribed by the computer software. Please disregards these errors. Please excuse any errors that have escaped final proofreading.)        Brock Amador MD  12/13/24 9762

## 2024-12-16 NOTE — ED PROVIDER NOTES
Quite often unanticipated grammatical, syntax, homophones, and other interpretive errors are inadvertently transcribed by the computer software. Please disregards these errors. Please excuse any errors that have escaped final proofreading.)      Silvia Ghotra PA-C  12/15/24 6729

## 2025-01-05 ENCOUNTER — HOSPITAL ENCOUNTER (EMERGENCY)
Facility: HOSPITAL | Age: 80
Discharge: NURSING, REHAB OR LONG-TERM CARE | End: 2025-01-08
Payer: MEDICARE

## 2025-01-05 ENCOUNTER — APPOINTMENT (OUTPATIENT)
Facility: HOSPITAL | Age: 80
End: 2025-01-05
Payer: MEDICARE

## 2025-01-05 DIAGNOSIS — R79.89 ELEVATED LACTIC ACID LEVEL: ICD-10-CM

## 2025-01-05 DIAGNOSIS — E87.1 HYPONATREMIA: Primary | ICD-10-CM

## 2025-01-05 LAB
ALBUMIN SERPL-MCNC: 3.3 G/DL (ref 3.5–5)
ALBUMIN/GLOB SERPL: 0.6 (ref 1.1–2.2)
ALP SERPL-CCNC: 128 U/L (ref 45–117)
ALT SERPL-CCNC: ABNORMAL U/L (ref 12–78)
ANION GAP SERPL CALC-SCNC: ABNORMAL MMOL/L (ref 2–12)
APPEARANCE UR: CLEAR
AST SERPL W P-5'-P-CCNC: ABNORMAL U/L (ref 15–37)
BACTERIA URNS QL MICRO: NEGATIVE /HPF
BASOPHILS # BLD: 0.1 K/UL (ref 0–0.1)
BASOPHILS NFR BLD: 1 % (ref 0–1)
BILIRUB DIRECT SERPL-MCNC: ABNORMAL MG/DL (ref 0–0.2)
BILIRUB SERPL-MCNC: 0.8 MG/DL (ref 0.2–1)
BILIRUB UR QL: NEGATIVE
BUN SERPL-MCNC: 17 MG/DL (ref 6–20)
BUN/CREAT SERPL: 15 (ref 12–20)
CA-I BLD-MCNC: 9.1 MG/DL (ref 8.5–10.1)
CHLORIDE SERPL-SCNC: 107 MMOL/L (ref 97–108)
CO2 SERPL-SCNC: 26 MMOL/L (ref 21–32)
COLOR UR: ABNORMAL
CREAT SERPL-MCNC: 1.12 MG/DL (ref 0.7–1.3)
DIFFERENTIAL METHOD BLD: ABNORMAL
EOSINOPHIL # BLD: 0.2 K/UL (ref 0–0.4)
EOSINOPHIL NFR BLD: 3 % (ref 0–7)
EPITH CASTS URNS QL MICRO: ABNORMAL /LPF
ERYTHROCYTE [DISTWIDTH] IN BLOOD BY AUTOMATED COUNT: 14 % (ref 11.5–14.5)
FLUAV RNA SPEC QL NAA+PROBE: NOT DETECTED
FLUBV RNA SPEC QL NAA+PROBE: NOT DETECTED
GLOBULIN SER CALC-MCNC: 5.8 G/DL (ref 2–4)
GLUCOSE SERPL-MCNC: 70 MG/DL (ref 65–100)
GLUCOSE UR STRIP.AUTO-MCNC: NEGATIVE MG/DL
HCT VFR BLD AUTO: 50 % (ref 36.6–50.3)
HGB BLD-MCNC: 16.1 G/DL (ref 12.1–17)
HGB UR QL STRIP: NEGATIVE
IMM GRANULOCYTES # BLD AUTO: 0 K/UL (ref 0–0.04)
IMM GRANULOCYTES NFR BLD AUTO: 0 % (ref 0–0.5)
KETONES UR QL STRIP.AUTO: NEGATIVE MG/DL
LACTATE SERPL-SCNC: 2.3 MMOL/L (ref 0.4–2)
LEUKOCYTE ESTERASE UR QL STRIP.AUTO: NEGATIVE
LYMPHOCYTES # BLD: 1.6 K/UL (ref 0.8–3.5)
LYMPHOCYTES NFR BLD: 31 % (ref 12–49)
MCH RBC QN AUTO: 31 PG (ref 26–34)
MCHC RBC AUTO-ENTMCNC: 32.2 G/DL (ref 30–36.5)
MCV RBC AUTO: 96.3 FL (ref 80–99)
MONOCYTES # BLD: 0.3 K/UL (ref 0–1)
MONOCYTES NFR BLD: 6 % (ref 5–13)
MUCOUS THREADS URNS QL MICRO: ABNORMAL /LPF
NEUTS SEG # BLD: 3.1 K/UL (ref 1.8–8)
NEUTS SEG NFR BLD: 59 % (ref 32–75)
NITRITE UR QL STRIP.AUTO: NEGATIVE
NRBC # BLD: 0 K/UL (ref 0–0.01)
NRBC BLD-RTO: 0 PER 100 WBC
PH UR STRIP: 6 (ref 5–8)
PLATELET # BLD AUTO: 129 K/UL (ref 150–400)
PMV BLD AUTO: 12.6 FL (ref 8.9–12.9)
POTASSIUM SERPL-SCNC: 3.6 MMOL/L (ref 3.5–5.1)
POTASSIUM SERPL-SCNC: ABNORMAL MMOL/L (ref 3.5–5.1)
PROT SERPL-MCNC: 9.1 G/DL (ref 6.4–8.2)
PROT UR STRIP-MCNC: NEGATIVE MG/DL
RBC # BLD AUTO: 5.19 M/UL (ref 4.1–5.7)
RBC #/AREA URNS HPF: ABNORMAL /HPF (ref 0–5)
RBC MORPH BLD: ABNORMAL
SARS-COV-2 RNA RESP QL NAA+PROBE: NOT DETECTED
SODIUM SERPL-SCNC: 127 MMOL/L (ref 136–145)
SP GR UR REFRACTOMETRY: 1.02 (ref 1–1.03)
TROPONIN I SERPL HS-MCNC: 10 NG/L (ref 0–76)
URINE CULTURE IF INDICATED: ABNORMAL
UROBILINOGEN UR QL STRIP.AUTO: 2 EU/DL (ref 0.1–1)
WBC # BLD AUTO: 5.3 K/UL (ref 4.1–11.1)
WBC URNS QL MICRO: ABNORMAL /HPF (ref 0–4)

## 2025-01-05 PROCEDURE — 96360 HYDRATION IV INFUSION INIT: CPT

## 2025-01-05 PROCEDURE — 84484 ASSAY OF TROPONIN QUANT: CPT

## 2025-01-05 PROCEDURE — 81001 URINALYSIS AUTO W/SCOPE: CPT

## 2025-01-05 PROCEDURE — 80076 HEPATIC FUNCTION PANEL: CPT

## 2025-01-05 PROCEDURE — 36415 COLL VENOUS BLD VENIPUNCTURE: CPT

## 2025-01-05 PROCEDURE — 87636 SARSCOV2 & INF A&B AMP PRB: CPT

## 2025-01-05 PROCEDURE — 93005 ELECTROCARDIOGRAM TRACING: CPT | Performed by: NURSE PRACTITIONER

## 2025-01-05 PROCEDURE — 83605 ASSAY OF LACTIC ACID: CPT

## 2025-01-05 PROCEDURE — 99285 EMERGENCY DEPT VISIT HI MDM: CPT

## 2025-01-05 PROCEDURE — 2580000003 HC RX 258: Performed by: NURSE PRACTITIONER

## 2025-01-05 PROCEDURE — 84132 ASSAY OF SERUM POTASSIUM: CPT

## 2025-01-05 PROCEDURE — 71046 X-RAY EXAM CHEST 2 VIEWS: CPT

## 2025-01-05 PROCEDURE — 85025 COMPLETE CBC W/AUTO DIFF WBC: CPT

## 2025-01-05 PROCEDURE — 80048 BASIC METABOLIC PNL TOTAL CA: CPT

## 2025-01-05 RX ORDER — 0.9 % SODIUM CHLORIDE 0.9 %
500 INTRAVENOUS SOLUTION INTRAVENOUS ONCE
Status: COMPLETED | OUTPATIENT
Start: 2025-01-05 | End: 2025-01-05

## 2025-01-05 RX ADMIN — SODIUM CHLORIDE 500 ML: 9 INJECTION, SOLUTION INTRAVENOUS at 21:23

## 2025-01-05 ASSESSMENT — PAIN - FUNCTIONAL ASSESSMENT: PAIN_FUNCTIONAL_ASSESSMENT: NONE - DENIES PAIN

## 2025-01-05 NOTE — ED TRIAGE NOTES
C/o right knee pain and right ankle pain    Pt lives in home without running water and electricity in a condemned house.

## 2025-01-06 LAB
ANION GAP SERPL CALC-SCNC: 2 MMOL/L (ref 2–12)
BUN SERPL-MCNC: 10 MG/DL (ref 6–20)
BUN/CREAT SERPL: 10 (ref 12–20)
CA-I BLD-MCNC: 8.2 MG/DL (ref 8.5–10.1)
CHLORIDE SERPL-SCNC: 111 MMOL/L (ref 97–108)
CO2 SERPL-SCNC: 28 MMOL/L (ref 21–32)
CREAT SERPL-MCNC: 1.04 MG/DL (ref 0.7–1.3)
EKG ATRIAL RATE: 100 BPM
EKG DIAGNOSIS: NORMAL
EKG P AXIS: 26 DEGREES
EKG P-R INTERVAL: 152 MS
EKG Q-T INTERVAL: 434 MS
EKG QRS DURATION: 72 MS
EKG QTC CALCULATION (BAZETT): 507 MS
EKG R AXIS: 17 DEGREES
EKG T AXIS: -1 DEGREES
EKG VENTRICULAR RATE: 82 BPM
GLUCOSE SERPL-MCNC: 74 MG/DL (ref 65–100)
LACTATE BLD-SCNC: 1.44 MMOL/L (ref 0.4–2)
LACTATE SERPL-SCNC: 2.7 MMOL/L (ref 0.4–2)
PERFORMED BY:: NORMAL
POTASSIUM SERPL-SCNC: 3.4 MMOL/L (ref 3.5–5.1)
SODIUM SERPL-SCNC: 141 MMOL/L (ref 136–145)

## 2025-01-06 PROCEDURE — 83605 ASSAY OF LACTIC ACID: CPT

## 2025-01-06 PROCEDURE — 97530 THERAPEUTIC ACTIVITIES: CPT

## 2025-01-06 PROCEDURE — 97161 PT EVAL LOW COMPLEX 20 MIN: CPT

## 2025-01-06 PROCEDURE — 96361 HYDRATE IV INFUSION ADD-ON: CPT

## 2025-01-06 PROCEDURE — 97165 OT EVAL LOW COMPLEX 30 MIN: CPT

## 2025-01-06 PROCEDURE — 2580000003 HC RX 258: Performed by: EMERGENCY MEDICINE

## 2025-01-06 PROCEDURE — 80048 BASIC METABOLIC PNL TOTAL CA: CPT

## 2025-01-06 PROCEDURE — 36415 COLL VENOUS BLD VENIPUNCTURE: CPT

## 2025-01-06 PROCEDURE — 6370000000 HC RX 637 (ALT 250 FOR IP): Performed by: EMERGENCY MEDICINE

## 2025-01-06 RX ORDER — 0.9 % SODIUM CHLORIDE 0.9 %
1000 INTRAVENOUS SOLUTION INTRAVENOUS ONCE
Status: COMPLETED | OUTPATIENT
Start: 2025-01-06 | End: 2025-01-06

## 2025-01-06 RX ORDER — POTASSIUM CHLORIDE 750 MG/1
40 TABLET, EXTENDED RELEASE ORAL ONCE
Status: COMPLETED | OUTPATIENT
Start: 2025-01-06 | End: 2025-01-06

## 2025-01-06 RX ORDER — ACETAMINOPHEN 500 MG
1000 TABLET ORAL
Status: COMPLETED | OUTPATIENT
Start: 2025-01-06 | End: 2025-01-06

## 2025-01-06 RX ADMIN — SODIUM CHLORIDE 1000 ML: 9 INJECTION, SOLUTION INTRAVENOUS at 01:59

## 2025-01-06 RX ADMIN — ACETAMINOPHEN 1000 MG: 500 TABLET, FILM COATED ORAL at 13:23

## 2025-01-06 RX ADMIN — POTASSIUM CHLORIDE 40 MEQ: 750 TABLET, EXTENDED RELEASE ORAL at 13:23

## 2025-01-06 ASSESSMENT — PAIN - FUNCTIONAL ASSESSMENT: PAIN_FUNCTIONAL_ASSESSMENT: NONE - DENIES PAIN

## 2025-01-06 NOTE — CARE COORDINATION
1/6/25 CM met with pt & per pt he cannot go home & requesting SNF. MD/Cary informed. Pt with contractures to hands & initialed Choice letter for [place]/local facilities. Referral sent via [employer]. Pt uses walker/w/c & nees assist with ADLs. Pt will need auth/therapy evals.

## 2025-01-06 NOTE — ED NOTES
Progress Note on [facility] or Case Management Hold    I received signout from Doctor Marquez  on Kieran Braun and have assumed care while this patient is in our Emergency Department. Please see the below progress notes from the prior providers and myself.    ED Course as of 01/06/25 1003   Mon Jan 06, 2025   1002 Received signout from Dr. Marquez pending case management evaluation for nursing home placement  [HP]      ED Course User Index  [HP] Kendy Lopez MD       Provider: Kendy Lopez MD   Note Started: 10:02 AM EST 1/6/25       Kendy Lopez MD  01/06/25 1007

## 2025-01-06 NOTE — CARE COORDINATION
1/6/25 Per [place] pt accepted to [facility] @ [phone] via transport upon d/c. Facility requesting [facility] & awaiting insurance eligibility for auth. Clinicals faxed to  @ [phone] via [facility].

## 2025-01-06 NOTE — PROGRESS NOTES
PHYSICAL THERAPY EVALUATION  Patient: Kieran Braun (79 y.o. male)  Date: 1/6/2025  Primary Diagnosis: No admission diagnoses are documented for this encounter.       Precautions: Restrictions/Precautions  Restrictions/Precautions: Fall Risk     Recommendations for nursing mobility: Out of bed to chair for meals, Use of BSC for toileting , AD and gt belt for bed to chair , and Assist x2    In place during session: None    ASSESSMENT  Pt is a 79 y.o. male admitted on 1/5/2025 for right knee and ankle pain (which pt reports has been going on for several months); pt currently being treated for hyponatremia and elevated lactic acid level, per chart review pt is currently residing in a condemned home with no access to electricity or heat. Pt semi-supine on ED stretcher upon PT arrival, agreeable to evaluation. Pt A&O x person and place only; disoriented to situation and time.     Based on the objective data described below, the patient currently presents with impaired functional mobility, decreased independence in ADLs, decreased ROM, impaired strength, decreased activity tolerance, poor safety awareness, impaired cognition, decreased command following, impaired balance, impaired posture, and increased pain levels. (See below for objective details and assist levels).     Overall pt tolerated session fair today with c/o 10/10 right knee pain throughout session. Pt required min to mod A for bed mobility and transfers. Pt unable to achieve full standing without use of RW due to c/o right knee pain even with mod A x2. When utilizing RW, required min to mod A. Pt sidestepped 4 feet with RW, gt belt and min to mod A; demonstrates WBOS with decreased stance time on right LE, short, shuffling, step to gt pattern. Pt will benefit from continued skilled PT to address above deficits and return to PLOF.  Current PT DC recommendation Moderate intensity short-term skilled physical therapy up to 5x/week  once medically

## 2025-01-06 NOTE — CARE COORDINATION
1/6/25 CM called [facility] @ [phone] & spoke with 2 reps( Maria Alejandra & Ana M). Pt's insurance has lapsed & will need eligibility check that cannot be done until tomorrow.  Case ID # : [id number].  Nsg & pt updated.

## 2025-01-07 PROCEDURE — 97530 THERAPEUTIC ACTIVITIES: CPT

## 2025-01-07 PROCEDURE — 6370000000 HC RX 637 (ALT 250 FOR IP)

## 2025-01-07 RX ORDER — ACETAMINOPHEN 325 MG/1
650 TABLET ORAL
Status: COMPLETED | OUTPATIENT
Start: 2025-01-07 | End: 2025-01-07

## 2025-01-07 RX ADMIN — ACETAMINOPHEN 650 MG: 325 TABLET ORAL at 18:46

## 2025-01-07 NOTE — CARE COORDINATION
1/7/25 CM called [facility] @ [phone] & spoke with rep ( Madeline - who stated eligibility check completed & pt is not eligible - advised  to be called. CM called  Insurance @ [phone] & spoke with rep ( Ela CANTU) who stated pt's  insurance is active effec: 1/25 with no termed date. Rep informed of above - rep attempting to contact pts' representative for auth - while on the phone with CM on hold.     Update: CM put on hold X 20 mins or more - hung up. Unable to reach  staff @ this time - will try again.

## 2025-01-07 NOTE — CARE COORDINATION
1/7/25 Per [facility] facility able to get eligibility &  auth started.  Ref #: [id number].

## 2025-01-07 NOTE — ED NOTES
Assumed care for this patient. Report received from Dm OJEDA. Resting at this time. Even respirations noted. Patient is on CM, has placement and was accepted at [place] however still awaiting for auth from insurance.

## 2025-01-07 NOTE — PLAN OF CARE
OCCUPATIONAL THERAPY TREATMENT  Patient: Kieran Braun (79 y.o. male)  Date: 1/7/2025  Primary Diagnosis: No admission diagnoses are documented for this encounter.       Precautions: Fall Risk, Bed Alarm                Recommendations for nursing mobility: Out of bed to chair for meals, Encourage HEP in prep for ADLs/mobility; see handout for details, Frequent repositioning to prevent skin breakdown, Use of bed/chair alarm for safety, AD and gt belt for bed to chair , and Assist x1    In place during session: None  Chart, occupational therapy assessment, plan of care, and goals were reviewed.    ASSESSMENT  Patient continues with skilled OT services and is progressing towards goals.  Pt received semi-supine in bed upon arrival, AXO x 3 and agreeable to HUDSON tx at this time. Pt cooperative and demonstrated fair effort during activities. Pt noted with improved cognition this visit. Pt noted with improved bed mobility>EOB with decreased physical assist and without cues. Pt stood with CGA/set-up for using urinal with slight unsteadiness but no LOB noted. EOB, pt set-up for simple grooming. Pt engaged in sylwia UE exercises with education and cueing provided regarding joint protection/proper technique/achieve full ROM needed to maintain/improve strength to increase performance in ADL'S and functional tf's, see grid below.  Pt completed STS (CGA) and side stepping towards HOB with CGA/min A for balance using gait belt only. Set-up for eating breakfast EOB with nursing aware. Pt educated on fall prevention and safety awareness to improve overall ADL performance and decrease falls, verbalizing fair understanding.  (See below for objective details and assist levels)     Overall, pt limited by pain, impaired cognition and generalized weakness that interferes with performance in ADL's and functional tf's safely.  Will continue to progress. Current OT recommendations for discharge Moderate intensity short-term skilled 
time;Adaptive equipment;Assist X2  Stand to Sit: Minimum assistance;Moderate assistance;Additional time;Adaptive equipment;Assist X1      Balance:  Balance  Sitting: Intact  Standing: Impaired  Standing - Static: Fair;Constant support  Standing - Dynamic: Fair;Constant support      ADL Assessment:   UE Dressing: Minimal assistance  UE Dressing Skilled Clinical Factors: [employer]/donning hospital gown while semi supine    Therapeutic Intervention provided:   UE dressing and functional mobility/transfers in preparation for OOB ADLs    Functional Measure:    [facility] AM-PACTM \"6 Clicks\"                                                       Daily Activity Inpatient Short Form  How much help from another person does the patient currently need... Total; A Lot A Little None   1.  Putting on and taking off regular lower body clothing? []  1 [x]  2 []  3 []  4   2.  Bathing (including washing, rinsing, drying)? []  1 [x]  2 []  3 []  4   3.  Toileting, which includes using toilet, bedpan or urinal? [] 1 [x]  2 []  3 []  4   4.  Putting on and taking off regular upper body clothing? []  1 []  2 [x]  3 []  4   5.  Taking care of personal grooming such as brushing teeth? []  1 []  2 [x]  3 []  4   6.  Eating meals? []  1 []  2 [x]  3 []  4   © 2007, Trustees of [facility], under license to [employer]. All rights reserved     Score: 15/24     Interpretation of Tool:  Represents clinically-significant functional categories (i.e. Activities of daily living).  Percentage of Impairment CH    0%   CI    1-19% CJ    20-39% CK    40-59% CL    60-79% CM    80-99% CN     100%   [facility]  Score 6-24 24 23 20-22 15-19 10-14 7-9 6     Occupational Therapy Evaluation Charge Determination   History Examination Decision-Making   LOW Complexity : Brief history review  MEDIUM Complexity: 3-5 Performance deficits relating to physical, cognitive, or psychosocial skills that result in activity limitations and/or participation

## 2025-01-07 NOTE — ED NOTES
Assumed care of patient from LYNETTE Guzmán.    Pt resting comfortably on ED stretcher with eyes closed. Breathing even and unlabored. No acute distress noted at this time.

## 2025-01-08 VITALS
RESPIRATION RATE: 17 BRPM | BODY MASS INDEX: 26.95 KG/M2 | DIASTOLIC BLOOD PRESSURE: 82 MMHG | OXYGEN SATURATION: 100 % | WEIGHT: 210 LBS | HEART RATE: 89 BPM | HEIGHT: 74 IN | TEMPERATURE: 97.9 F | SYSTOLIC BLOOD PRESSURE: 139 MMHG

## 2025-01-08 ASSESSMENT — PAIN - FUNCTIONAL ASSESSMENT: PAIN_FUNCTIONAL_ASSESSMENT: 0-10

## 2025-01-08 ASSESSMENT — PAIN DESCRIPTION - LOCATION: LOCATION: LEG

## 2025-01-08 ASSESSMENT — PAIN SCALES - GENERAL
PAINLEVEL_OUTOF10: 5
PAINLEVEL_OUTOF10: 0

## 2025-01-08 NOTE — ED NOTES
Progress Note on Active Behavioral Health or Case Management Hold    I received signout from Doctor Mello  on Kieran Braun and have assumed care while this patient is in our Emergency Department. Please see the below progress notes from the prior providers and myself.    ED Course as of 01/08/25 0617   Mon Jan 06, 2025   1002 Received signout from Dr. Marquez pending case management evaluation for nursing home placement  [HP]   Tue Jan 07, 2025   1628 Received pt in signout from Dr. Mercer. Vitals stable, labs reviewed and stable. Pt is pending case management evaluation for nursing home placement.  [DM]   1838 No sign-out received, however, nursing reports that patient is complaining of knee pain. Review of records reveal that patient had acetaminophen at 13:23 which provided relief. Will order more acetaminophen.  [HW]   Wed Jan 08, 2025   0610 Received signout pending nursing home placement [HP]      ED Course User Index  [DM] Elva Pena MD  [HP] Kendy Lopez MD  [HW] Tamanna Rodriguez PA-C       Provider: Kendy Lopez MD   Note Started: 6:17 AM EST 1/8/25       Kendy Lopez MD  01/08/25 0617

## 2025-01-08 NOTE — ED NOTES
Pt resting comfortably @ this time. Pt updated on plan of care/CM hold. Pt call bell in reach. Denies needs.

## 2025-01-08 NOTE — ED NOTES
Report given to [facility] team. All DC paperwork sent with team. Care assumed by team @ this time. No further questions.

## 2025-01-08 NOTE — ED PROVIDER NOTES
Discharge Summary/Progress Note on Active Behavioral Health or Case Management Hold    I received signout from Doctor Lopez   on Kieran Braun and have assumed care while this patient is in our Emergency Department. Please see the below progress notes from the prior providers and myself.    ED Course as of 01/08/25 1051   Mon Jan 06, 2025   1002 Received signout from Dr. Marquez pending case management evaluation for nursing home placement  [HP]   Tue Jan 07, 2025   1628 Received pt in signout from Dr. Mercer. Vitals stable, labs reviewed and stable. Pt is pending case management evaluation for nursing home placement.  [DM]   1838 No sign-out received, however, nursing reports that patient is complaining of knee pain. Review of records reveal that patient had acetaminophen at 13:23 which provided relief. Will order more acetaminophen.  [HW]   Wed Jan 08, 2025   0610 Received signout pending nursing home placement [HP]      ED Course User Index  [DM] Elva Pena MD  [HP] Kendy Lopez MD  [HW] Tamanna Rodriguez PA-C         Patient signed pending placement.  The patient is cleared to be discharged for placement at [facility].  Patient vitals has been stable today.  See initial ED provider note from 1/6/2025, for further details concerning patient's presentation to the ER.      Provider: Susi Baltazar MD   Note Started: 10:51 AM EST 1/8/25       Susi Baltazar MD  01/08/25 1120       Susi Baltazar MD  01/08/25 1123    
Progress Note on Active Behavioral Health or Case Management Hold    I received signout from Doctor Roberto Moss  on Kieran Braun and have assumed care while this patient is in our Emergency Department. Please see the below progress notes from the prior providers and myself.    ED Course as of 01/07/25 1635   Mon Jan 06, 2025   1002 Received signout from Dr. Marquez pending case management evaluation for nursing home placement  [HP]   Tue Jan 07, 2025   1628 Received pt in signout from Dr. Mercer. Vitals stable, labs reviewed and stable. Pt is pending case management evaluation for nursing home placement.  [DM]      ED Course User Index  [DM] Elva Pena MD  [HP] Kendy Lopez MD       Provider: Elva Pena MD   Note Started: 4:28 PM EST 1/7/25    
imaging, or hospitalization. At this time, I estimate the risks of additional testing, imaging, or hospitalization to be equal to or greater than the risk of discharge(in the case of discharge home).      The patient's HEART Score is 3. In rare cases, I give patients with HEART Score of 4 the option of discharge, but only when they meet criteria for \"Low 4,\" meaning that HST was used, and the 4 is not from a highly suspicious story, highly suspicious EKG, or positive cardiac enzymes.  In these selected cases, the risk of a \"Low 4\" is still most likely lower than the risk of admission and further testing/imaging.     SHARED DECISION MAKING:   I discussed my risk assessment with the patient. The patient understands and consents to the risk of disposition/plan, as well as the risk of uncertainty in estimating outcomes.           Patient was given the following medications:  Medications   sodium chloride 0.9 % bolus 500 mL (0 mLs IntraVENous Stopped 1/5/25 2330)       CONSULTS: See ED Course/MDM for further details.  IP CONSULT TO CASE MANAGEMENT     Social Determinants affecting Diagnosis/Treatment: Patient is homeless/has housing insecurity.  Given Housing Resources.    Smoking Cessation: Smoking Cessation Counseling  Discussed the risks of smoking tobacco products and the long term sequelae of tobacco use with the patient such as increased risk of heart attack, stroke, peripheral artery disease and cancer. The patient verbalized their understanding and were provided resources if asked. Counseled patient for approximately 3 minutes.     PROCEDURES   Unless otherwise noted above, none.  Procedures      CRITICAL CARE TIME   Patient does not meet Critical Care Time, 0 minutes    ED IMPRESSION     1. Hyponatremia    2. Elevated lactic acid level          DISPOSITION/PLAN   DISPOSITION Ed Observation 01/05/2025 10:07:37 PM   DISPOSITION CONDITION Stable     Patient handoff report give to Dr. Marquez at 0110   Pending  or

## 2025-01-08 NOTE — DISCHARGE INSTRUCTIONS
Thank you for choosing our Emergency Department for your care.  It is our privilege to care for you in your time of need.  In the next several days, you may receive a survey via email or mailed to your home about your experience with our team.  We would greatly appreciate you taking a few minutes to complete the survey, as we use this information to learn what we have done well and what we could be doing better. Thank you for trusting us with your care!    Below you will find a list of your tests from today's visit.   Labs and Radiology Studies  Recent Results (from the past 72 hour(s))   EKG 12 Lead (SOB)    Collection Time: 01/05/25  6:48 PM   Result Value Ref Range    Ventricular Rate 82 BPM    Atrial Rate 100 BPM    P-R Interval 152 ms    QRS Duration 72 ms    Q-T Interval 434 ms    QTc Calculation (Bazett) 507 ms    P Axis 26 degrees    R Axis 17 degrees    T Axis -1 degrees    Diagnosis       Sinus rhythm with marked sinus arrhythmia  Prolonged QT  Abnormal ECG  When compared with ECG of 05-JAN-2025 18:48, (Unconfirmed)  Premature atrial complexes are no longer Present  Inverted T waves have replaced nonspecific T wave abnormality in Inferior   leads  Confirmed by Angel CRONIN [facility] ([id number]) on 1/6/2025 3:07:17 PM     CBC with Auto Differential    Collection Time: 01/05/25  6:53 PM   Result Value Ref Range    WBC 5.3 4.1 - 11.1 K/uL    RBC 5.19 4.10 - 5.70 M/uL    Hemoglobin 16.1 12.1 - 17.0 g/dL    Hematocrit 50.0 36.6 - 50.3 %    MCV 96.3 80.0 - 99.0 FL    MCH 31.0 26.0 - 34.0 PG    MCHC 32.2 30.0 - 36.5 g/dL    RDW 14.0 11.5 - 14.5 %    Platelets 129 (L) 150 - 400 K/uL    MPV 12.6 8.9 - 12.9 FL    Nucleated RBCs 0.0 0.0  WBC    nRBC 0.00 0.00 - 0.01 K/uL    Neutrophils % 59 32 - 75 %    Lymphocytes % 31 12 - 49 %    Monocytes % 6 5 - 13 %    Eosinophils % 3 0 - 7 %    Basophils % 1 0 - 1 %    Immature Granulocytes % 0 0 - 0.5 %    Neutrophils Absolute 3.1 1.8 - 8.0 K/UL    Lymphocytes Absolute 1.6

## 2025-01-08 NOTE — ED NOTES
Patient in room on stretcher at this time. Patient stable, alert and awake w/o complaints at this time. Patient updated on current status on acceptance to [facility]; awaiting authorization from insurance.

## 2025-01-08 NOTE — CARE COORDINATION
1/8//25 Per [place] - pt accepted to [facility] the [facility] rm: [id number]. Nsg to call report to [phone]. Facility requesting 1pm transfer - unit secretary informed to set up transport @ 1pm. MD & Nsg updated.